# Patient Record
Sex: MALE | Race: WHITE | NOT HISPANIC OR LATINO | Employment: UNEMPLOYED | URBAN - NONMETROPOLITAN AREA
[De-identification: names, ages, dates, MRNs, and addresses within clinical notes are randomized per-mention and may not be internally consistent; named-entity substitution may affect disease eponyms.]

---

## 2019-01-06 ENCOUNTER — HOSPITAL ENCOUNTER (EMERGENCY)
Facility: HOSPITAL | Age: 19
Discharge: HOME | End: 2019-01-06
Attending: EMERGENCY MEDICINE | Admitting: EMERGENCY MEDICINE
Payer: COMMERCIAL

## 2019-01-06 ENCOUNTER — APPOINTMENT (EMERGENCY)
Dept: RADIOLOGY | Facility: HOSPITAL | Age: 19
End: 2019-01-06
Payer: COMMERCIAL

## 2019-01-06 VITALS
OXYGEN SATURATION: 99 % | WEIGHT: 176.81 LBS | TEMPERATURE: 99 F | HEART RATE: 78 BPM | SYSTOLIC BLOOD PRESSURE: 147 MMHG | RESPIRATION RATE: 17 BRPM | DIASTOLIC BLOOD PRESSURE: 97 MMHG

## 2019-01-06 DIAGNOSIS — S83.105A DISLOCATION OF LEFT KNEE, INITIAL ENCOUNTER: Primary | ICD-10-CM

## 2019-01-06 PROCEDURE — 73560 X-RAY EXAM OF KNEE 1 OR 2: CPT

## 2019-01-06 PROCEDURE — 96374 THER/PROPH/DIAG INJ IV PUSH: CPT

## 2019-01-06 PROCEDURE — 99284 EMERGENCY DEPT VISIT MOD MDM: CPT

## 2019-01-06 PROCEDURE — 96360 HYDRATION IV INFUSION INIT: CPT

## 2019-01-06 RX ORDER — MIDAZOLAM HYDROCHLORIDE 1 MG/ML
2 INJECTION INTRAMUSCULAR; INTRAVENOUS ONCE
Status: COMPLETED | OUTPATIENT
Start: 2019-01-06 | End: 2019-01-06

## 2019-01-06 RX ORDER — FENTANYL CITRATE 50 UG/ML
50 INJECTION, SOLUTION INTRAMUSCULAR; INTRAVENOUS ONCE
Status: COMPLETED | OUTPATIENT
Start: 2019-01-06 | End: 2019-01-06

## 2019-01-06 RX ADMIN — SODIUM CHLORIDE 1000 ML: 0.9 INJECTION, SOLUTION INTRAVENOUS at 15:11

## 2019-01-06 RX ADMIN — FENTANYL CITRATE 50 MCG: 50 INJECTION INTRAMUSCULAR; INTRAVENOUS at 15:50

## 2019-01-06 RX ADMIN — MIDAZOLAM HYDROCHLORIDE 2 MG: 1 INJECTION, SOLUTION INTRAMUSCULAR; INTRAVENOUS at 15:51

## 2019-01-06 NOTE — DISCHARGE INSTRUCTIONS
Knee Dislocation   WHAT YOU NEED TO KNOW:   A knee dislocation happens when an injury forces your thigh bone out of alignment with your shin bone  It may occur with other injuries  It can also cause torn ligaments in your knee or nerve damage  It can be caused by a car or motorcycle accident, a sports injury, or a fall  DISCHARGE INSTRUCTIONS:   Medicines: You may need any of the following:  · NSAIDs , such as ibuprofen, help decrease swelling, pain, and fever  This medicine is available with or without a doctor's order  NSAIDs can cause stomach bleeding or kidney problems in certain people  If you take blood thinner medicine, always ask your healthcare provider if NSAIDs are safe for you  Always read the medicine label and follow directions  · Acetaminophen  decreases pain  It is available without a doctor's order  Ask how much to take and how often to take it  Follow directions  Acetaminophen can cause liver damage if not taken correctly  · Prescription pain medicine  helps decrease your pain  Do not wait until the pain is severe before you take this medicine  · Antibiotics  help fight or prevent an infection  They may be given for 1 to 2 days after surgery or if you have an open wound  · Blood thinners  reduce your risk of a blood clot  · Take your medicine as directed  Contact your healthcare provider if you think your medicine is not helping or if you have side effects  Tell him or her if you are allergic to any medicine  Keep a list of the medicines, vitamins, and herbs you take  Include the amounts, and when and why you take them  Bring the list or the pill bottles to follow-up visits  Carry your medicine list with you in case of an emergency  Follow up with your healthcare provider as directed: You may need to have stitches removed after surgery  Write down your questions so you remember to ask them during your visits  Wound care:  Carefully wash the wound with soap and water   Dry the area and put on new, clean bandages as directed  Change your bandages when they get wet or dirty  Self-care: It may take weeks or months for your knee to heal and for you to return to your previous activity level  · Immobilize your knee  for up to 6 weeks or as directed  Your healthcare provider may put on a cast or splint  You may need to wear a leg brace to stabilize your knee  A leg brace can be adjusted to increase your range of motion as your knee heals  · Use crutches  if your healthcare provider tells you not to put weight on your injured knee  Healthcare providers will show how to use crutches  You may need crutches for 4 to 6 weeks  · Physical therapy  teaches you exercises to strengthen your leg, increase balance, and decrease pain  Physical therapy may also help prevent another knee injury  Contact your healthcare provider:   · You have leg pain that does not go away after you take pain medicine  · Your knee is red or swollen, or your wound is leaking pus  · Your stitches come apart  · You have questions or concerns about your condition or care  Seek care immediately or call 911 if:   · Blood soaks through your bandage  · You have severe pain and swelling in your lower leg  · Your lower leg looks pale  · You feel lightheaded, short of breath, and have chest pain  · You cough up blood  · Your leg feels warm, tender, and painful  It may look swollen and red  © 2017 [address] Information is for End User's use only and may not be sold, redistributed or otherwise used for commercial purposes  All illustrations and images included in CareNotes® are the copyrighted property of A D A M , Inc  or Lalit Randolph  The above information is an  only  It is not intended as medical advice for individual conditions or treatments   Talk to your doctor, nurse or pharmacist before following any medical regimen to see if it is safe and effective for you

## 2019-01-06 NOTE — ED PROCEDURE NOTE
Procedure  Orthopedic Injury  Date/Time: 1/6/2019 3:55 PM  Performed by: Deana Lopez by: Sirisha Mcdonnell     Patient Location:  Bedside  Other Assisting Provider: Yes (comment) (Dr Daryl Barba )    Verbal consent obtained?: Yes    Consent given by:  Patient  Patient states understanding of procedure being performed: Yes    Radiology Images displayed and confirmed  If images not available, report reviewed: Yes    Patient identity confirmed:  Verbally with patient  Injury location:  Knee  Location details:  Left knee  Injury type:  Dislocation  Dislocation type: lateral    Neurovascular status: Neurovascularly intact    Distal perfusion: normal    Neurological function: normal    Range of motion: reduced    Local anesthesia used?: No    General anesthesia used?: No    Manipulation performed?: Yes    Reduction method:  Direct traction  Reduction method:  Direct traction  Reduction method:  Direct traction  Reduction method:  Direct traction  Reduction method:  Direct traction  Reduction method:  Direct traction  Reduction successful?: Yes    Confirmation: Reduction confirmed by x-ray    Immobilization:  Knee immobilizer  Neurovascular status: Neurovascularly intact    Distal perfusion: normal    Neurological function: normal    Range of motion: normal    Patient tolerance:  Patient tolerated the procedure well with no immediate complications   Patient was given 2 mg of Versed intravenously as well as 50 mcg of fentanyl  Patient tolerated this procedure well                        Mike Berkowitz PA-C  01/06/19 7607

## 2019-01-06 NOTE — ED PROVIDER NOTES
History  Chief Complaint   Patient presents with    Knee Injury - Major     pt has an obvious deformity to left knee from football injury  Patient presents to the emergency department today for evaluation left knee injury  He presents via [facility]  Patient is alert orient x4 stating while playing football all planted in the ground another player slid into the knee causing a medial to lateral force on the knee  He notes obvious deformity  Presents with knee slightly flexed with apparent obvious patella dislocation  Denies any sensation deficits distally  Full range of motion of toes with normal dorsalis pedis pulse distally            None       History reviewed  No pertinent past medical history  History reviewed  No pertinent surgical history  History reviewed  No pertinent family history  I have reviewed and agree with the history as documented  Social History   Substance Use Topics    Smoking status: Current Every Day Smoker     Packs/day: 0 50     Types: Cigarettes    Smokeless tobacco: Never Used    Alcohol use No        Review of Systems   Constitutional: Negative  HENT: Negative  Eyes: Negative  Respiratory: Negative  Cardiovascular: Negative  Endocrine: Negative  Genitourinary: Negative  Musculoskeletal:        L knee deformity   Skin: Negative  Allergic/Immunologic: Negative  Neurological: Negative  Hematological: Negative  Psychiatric/Behavioral: Negative  All other systems reviewed and are negative  Physical Exam  Physical Exam   Constitutional: He is oriented to person, place, and time  He appears well-developed and well-nourished  No distress  HENT:   Head: Normocephalic  Eyes: Pupils are equal, round, and reactive to light  Neck: Normal range of motion  Cardiovascular: Normal rate  Pulmonary/Chest: Effort normal    Musculoskeletal: He exhibits edema, tenderness and deformity     Apparent laterally dislocated left patella  Patient has normal dorsalis pedis pulse normal sensation and brisk capillary refill less than 2 seconds distally  Neurological: He is alert and oriented to person, place, and time  Skin: He is not diaphoretic  Vitals reviewed  Vital Signs  ED Triage Vitals   Temperature Pulse Respirations Blood Pressure SpO2   01/06/19 1445 01/06/19 1442 01/06/19 1442 01/06/19 1442 01/06/19 1442   99 °F (37 2 °C) 78 21 141/64 100 %      Temp Source Heart Rate Source Patient Position - Orthostatic VS BP Location FiO2 (%)   01/06/19 1445 01/06/19 1442 01/06/19 1442 01/06/19 1442 --   Temporal Monitor Lying Left arm       Pain Score       01/06/19 1442       7           Vitals:    01/06/19 1442 01/06/19 1600   BP: 141/64 147/97   Pulse: 78 78   Patient Position - Orthostatic VS: Lying        Visual Acuity      ED Medications  Medications    EMS REPLENISHMENT MED ( Does not apply Given to EMS 1/6/19 1440)   sodium chloride 0 9 % bolus 1,000 mL (1,000 mL Intravenous New Bag 1/6/19 1511)   midazolam (VERSED) injection 2 mg (2 mg Intravenous Given 1/6/19 1551)   fentanyl citrate (PF) 100 MCG/2ML 50 mcg (50 mcg Intravenous Given 1/6/19 1550)       Diagnostic Studies  Results Reviewed     None                 XR knee 1 or 2 vw left   ED Interpretation by Ramila Kirk PA-C (01/06 1524)   Patellar dislocation without evidence of acute osseous abnormality       by Nay Eagle (01/06 1457)      XR knee 1 or 2 vw left    (Results Pending)              Procedures  Procedures       Phone Contacts  ED Phone Contact    ED Course  ED Course as of Jan 06 1612   Sun Jan 06, 2019   1446 Blood Pressure: 141/64   1446 Temperature: 99 °F (37 2 °C)   1446 Pulse: 78   1446 SpO2: 100 %   1609 Post reduction film reveals successful reduction without evidence of fracture  Patient placed in a knee immobilizer crutches given  Will follow up with Orthopedics                                  DERIK Leonardo Time    Disposition  Final diagnoses:   Dislocation of left knee, initial encounter     Time reflects when diagnosis was documented in both MDM as applicable and the Disposition within this note     Time User Action Codes Description Comment    1/6/2019  4:10 PM Merlinda Slocumb Add [O50 105A] Dislocation of left knee, initial encounter       ED Disposition     ED Disposition Condition Comment    Discharge  Sole Salgado discharge to home/self care  Condition at discharge: Good        Follow-up Information     Follow up With Specialties Details Why Contact Info    Esme Brooks MD Orthopedic Surgery Schedule an appointment as soon as possible for a visit If symptoms worsen [address], [place] [address] [phone]            Patient's Medications    No medications on file     No discharge procedures on file      ED Provider  Electronically Signed by           Janette Esquivel PA-C  01/06/19 3824

## 2019-01-14 ENCOUNTER — HOSPITAL ENCOUNTER (OUTPATIENT)
Facility: HOSPITAL | Age: 19
Setting detail: OBSERVATION
Discharge: HOME HEALTH | End: 2019-01-17
Attending: EMERGENCY MEDICINE | Admitting: INTERNAL MEDICINE
Payer: COMMERCIAL

## 2019-01-14 ENCOUNTER — APPOINTMENT (EMERGENCY)
Dept: CT IMAGING | Facility: HOSPITAL | Age: 19
End: 2019-01-14
Payer: COMMERCIAL

## 2019-01-14 ENCOUNTER — APPOINTMENT (EMERGENCY)
Dept: ULTRASOUND IMAGING | Facility: HOSPITAL | Age: 19
End: 2019-01-14
Payer: COMMERCIAL

## 2019-01-14 DIAGNOSIS — M25.562 ACUTE PAIN OF LEFT KNEE: ICD-10-CM

## 2019-01-14 DIAGNOSIS — M79.89 PAIN AND SWELLING OF LOWER EXTREMITY: ICD-10-CM

## 2019-01-14 DIAGNOSIS — M25.469 KNEE EFFUSION: Primary | ICD-10-CM

## 2019-01-14 DIAGNOSIS — S83.005D DISLOCATION OF LEFT PATELLA, SUBSEQUENT ENCOUNTER: ICD-10-CM

## 2019-01-14 DIAGNOSIS — M25.462 EFFUSION OF LEFT KNEE: ICD-10-CM

## 2019-01-14 DIAGNOSIS — M79.606 PAIN AND SWELLING OF LOWER EXTREMITY: ICD-10-CM

## 2019-01-14 LAB
ANION GAP SERPL CALCULATED.3IONS-SCNC: 10 MMOL/L (ref 4–13)
APTT PPP: 29 SECONDS (ref 26–38)
BASOPHILS # BLD AUTO: 0.07 THOUSANDS/ΜL (ref 0–0.1)
BASOPHILS NFR BLD AUTO: 1 % (ref 0–1)
BUN SERPL-MCNC: 14 MG/DL (ref 5–25)
CALCIUM SERPL-MCNC: 9.6 MG/DL (ref 8.3–10.1)
CHLORIDE SERPL-SCNC: 100 MMOL/L (ref 100–108)
CO2 SERPL-SCNC: 29 MMOL/L (ref 21–32)
CREAT SERPL-MCNC: 0.93 MG/DL (ref 0.6–1.3)
EOSINOPHIL # BLD AUTO: 0.19 THOUSAND/ΜL (ref 0–0.61)
EOSINOPHIL NFR BLD AUTO: 2 % (ref 0–6)
ERYTHROCYTE [DISTWIDTH] IN BLOOD BY AUTOMATED COUNT: 12.6 % (ref 11.6–15.1)
GFR SERPL CREATININE-BSD FRML MDRD: 119 ML/MIN/1.73SQ M
GLUCOSE SERPL-MCNC: 93 MG/DL (ref 65–140)
HCT VFR BLD AUTO: 42 % (ref 36.5–49.3)
HGB BLD-MCNC: 14.2 G/DL (ref 12–17)
IMM GRANULOCYTES # BLD AUTO: 0.04 THOUSAND/UL (ref 0–0.2)
IMM GRANULOCYTES NFR BLD AUTO: 0 % (ref 0–2)
INR PPP: 0.97 (ref 0.86–1.17)
LYMPHOCYTES # BLD AUTO: 1.32 THOUSANDS/ΜL (ref 0.6–4.47)
LYMPHOCYTES NFR BLD AUTO: 10 % (ref 14–44)
MCH RBC QN AUTO: 31.1 PG (ref 26.8–34.3)
MCHC RBC AUTO-ENTMCNC: 33.8 G/DL (ref 31.4–37.4)
MCV RBC AUTO: 92 FL (ref 82–98)
MONOCYTES # BLD AUTO: 1.11 THOUSAND/ΜL (ref 0.17–1.22)
MONOCYTES NFR BLD AUTO: 9 % (ref 4–12)
NEUTROPHILS # BLD AUTO: 10.33 THOUSANDS/ΜL (ref 1.85–7.62)
NEUTS SEG NFR BLD AUTO: 78 % (ref 43–75)
NRBC BLD AUTO-RTO: 0 /100 WBCS
PLATELET # BLD AUTO: 345 THOUSANDS/UL (ref 149–390)
PMV BLD AUTO: 8.9 FL (ref 8.9–12.7)
POTASSIUM SERPL-SCNC: 3.8 MMOL/L (ref 3.5–5.3)
PROTHROMBIN TIME: 12.4 SECONDS (ref 11.8–14.2)
RBC # BLD AUTO: 4.56 MILLION/UL (ref 3.88–5.62)
SODIUM SERPL-SCNC: 139 MMOL/L (ref 136–145)
WBC # BLD AUTO: 13.06 THOUSAND/UL (ref 4.31–10.16)

## 2019-01-14 PROCEDURE — 80048 BASIC METABOLIC PNL TOTAL CA: CPT | Performed by: PHYSICIAN ASSISTANT

## 2019-01-14 PROCEDURE — 99285 EMERGENCY DEPT VISIT HI MDM: CPT

## 2019-01-14 PROCEDURE — 96361 HYDRATE IV INFUSION ADD-ON: CPT

## 2019-01-14 PROCEDURE — 85025 COMPLETE CBC W/AUTO DIFF WBC: CPT | Performed by: PHYSICIAN ASSISTANT

## 2019-01-14 PROCEDURE — 93971 EXTREMITY STUDY: CPT

## 2019-01-14 PROCEDURE — 96374 THER/PROPH/DIAG INJ IV PUSH: CPT

## 2019-01-14 PROCEDURE — 96376 TX/PRO/DX INJ SAME DRUG ADON: CPT

## 2019-01-14 PROCEDURE — 85610 PROTHROMBIN TIME: CPT | Performed by: PHYSICIAN ASSISTANT

## 2019-01-14 PROCEDURE — 85730 THROMBOPLASTIN TIME PARTIAL: CPT | Performed by: PHYSICIAN ASSISTANT

## 2019-01-14 PROCEDURE — 73706 CT ANGIO LWR EXTR W/O&W/DYE: CPT

## 2019-01-14 RX ORDER — MORPHINE SULFATE 4 MG/ML
4 INJECTION, SOLUTION INTRAMUSCULAR; INTRAVENOUS ONCE
Status: COMPLETED | OUTPATIENT
Start: 2019-01-14 | End: 2019-01-14

## 2019-01-14 RX ADMIN — MORPHINE SULFATE 4 MG: 4 INJECTION INTRAVENOUS at 21:38

## 2019-01-14 RX ADMIN — SODIUM CHLORIDE 1000 ML: 0.9 INJECTION, SOLUTION INTRAVENOUS at 19:06

## 2019-01-14 RX ADMIN — IOHEXOL 120 ML: 350 INJECTION, SOLUTION INTRAVENOUS at 20:36

## 2019-01-14 RX ADMIN — MORPHINE SULFATE 4 MG: 4 INJECTION INTRAVENOUS at 19:06

## 2019-01-14 NOTE — ED NOTES
The knee was dislocated on 1-6-19, since then he has been having swelling for three days and today the pain was 9/10 that he had to come to the ED  The leg has +3 edema, and cant feel soft touch on his toes       Artur Yousif RN  01/14/19 0759

## 2019-01-15 ENCOUNTER — APPOINTMENT (OUTPATIENT)
Dept: RADIOLOGY | Facility: HOSPITAL | Age: 19
End: 2019-01-15
Payer: COMMERCIAL

## 2019-01-15 ENCOUNTER — APPOINTMENT (OUTPATIENT)
Dept: MRI IMAGING | Facility: HOSPITAL | Age: 19
End: 2019-01-15
Payer: COMMERCIAL

## 2019-01-15 PROBLEM — M25.462 KNEE EFFUSION, LEFT: Status: ACTIVE | Noted: 2019-01-15

## 2019-01-15 PROBLEM — R52 ACUTE PAIN: Status: ACTIVE | Noted: 2019-01-15

## 2019-01-15 PROBLEM — M25.562 ACUTE PAIN OF LEFT KNEE: Status: ACTIVE | Noted: 2019-01-15

## 2019-01-15 LAB
ANION GAP SERPL CALCULATED.3IONS-SCNC: 8 MMOL/L (ref 4–13)
BUN SERPL-MCNC: 13 MG/DL (ref 5–25)
CALCIUM SERPL-MCNC: 9.1 MG/DL (ref 8.3–10.1)
CHLORIDE SERPL-SCNC: 101 MMOL/L (ref 100–108)
CO2 SERPL-SCNC: 29 MMOL/L (ref 21–32)
CREAT SERPL-MCNC: 0.98 MG/DL (ref 0.6–1.3)
CRYSTALS SNV QL MICRO: NORMAL
ERYTHROCYTE [DISTWIDTH] IN BLOOD BY AUTOMATED COUNT: 12.6 % (ref 11.6–15.1)
GFR SERPL CREATININE-BSD FRML MDRD: 111 ML/MIN/1.73SQ M
GLUCOSE P FAST SERPL-MCNC: 95 MG/DL (ref 65–99)
GLUCOSE SERPL-MCNC: 95 MG/DL (ref 65–140)
HCT VFR BLD AUTO: 39.8 % (ref 36.5–49.3)
HGB BLD-MCNC: 13.1 G/DL (ref 12–17)
LYMPHOCYTES NFR BLD AUTO: 3 %
MAGNESIUM SERPL-MCNC: 2.3 MG/DL (ref 1.6–2.6)
MCH RBC QN AUTO: 30.8 PG (ref 26.8–34.3)
MCHC RBC AUTO-ENTMCNC: 32.9 G/DL (ref 31.4–37.4)
MCV RBC AUTO: 93 FL (ref 82–98)
MONOCYTES NFR BLD AUTO: 3 %
NEUTS SEG NFR BLD AUTO: 94 %
PHOSPHATE SERPL-MCNC: 5.2 MG/DL (ref 2.7–4.5)
PLATELET # BLD AUTO: 296 THOUSANDS/UL (ref 149–390)
PMV BLD AUTO: 8.8 FL (ref 8.9–12.7)
POTASSIUM SERPL-SCNC: 3.7 MMOL/L (ref 3.5–5.3)
RBC # BLD AUTO: 4.26 MILLION/UL (ref 3.88–5.62)
SODIUM SERPL-SCNC: 138 MMOL/L (ref 136–145)
TOTAL CELLS COUNTED SPEC: 100
WBC # BLD AUTO: 9.89 THOUSAND/UL (ref 4.31–10.16)
WBC # FLD MANUAL: 1538 /UL

## 2019-01-15 PROCEDURE — 83735 ASSAY OF MAGNESIUM: CPT | Performed by: NURSE PRACTITIONER

## 2019-01-15 PROCEDURE — 73564 X-RAY EXAM KNEE 4 OR MORE: CPT

## 2019-01-15 PROCEDURE — 73721 MRI JNT OF LWR EXTRE W/O DYE: CPT

## 2019-01-15 PROCEDURE — 89051 BODY FLUID CELL COUNT: CPT | Performed by: ORTHOPAEDIC SURGERY

## 2019-01-15 PROCEDURE — 87070 CULTURE OTHR SPECIMN AEROBIC: CPT | Performed by: ORTHOPAEDIC SURGERY

## 2019-01-15 PROCEDURE — 99220 PR INITIAL OBSERVATION CARE/DAY 70 MINUTES: CPT | Performed by: NURSE PRACTITIONER

## 2019-01-15 PROCEDURE — 89060 EXAM SYNOVIAL FLUID CRYSTALS: CPT | Performed by: ORTHOPAEDIC SURGERY

## 2019-01-15 PROCEDURE — 80048 BASIC METABOLIC PNL TOTAL CA: CPT | Performed by: NURSE PRACTITIONER

## 2019-01-15 PROCEDURE — 99243 OFF/OP CNSLTJ NEW/EST LOW 30: CPT | Performed by: ORTHOPAEDIC SURGERY

## 2019-01-15 PROCEDURE — 93971 EXTREMITY STUDY: CPT | Performed by: SURGERY

## 2019-01-15 PROCEDURE — 84100 ASSAY OF PHOSPHORUS: CPT | Performed by: NURSE PRACTITIONER

## 2019-01-15 PROCEDURE — 36415 COLL VENOUS BLD VENIPUNCTURE: CPT | Performed by: NURSE PRACTITIONER

## 2019-01-15 PROCEDURE — 85027 COMPLETE CBC AUTOMATED: CPT | Performed by: NURSE PRACTITIONER

## 2019-01-15 PROCEDURE — 87205 SMEAR GRAM STAIN: CPT | Performed by: ORTHOPAEDIC SURGERY

## 2019-01-15 PROCEDURE — 20610 DRAIN/INJ JOINT/BURSA W/O US: CPT | Performed by: ORTHOPAEDIC SURGERY

## 2019-01-15 RX ORDER — OXYCODONE HYDROCHLORIDE 5 MG/1
5 TABLET ORAL EVERY 4 HOURS PRN
Status: DISCONTINUED | OUTPATIENT
Start: 2019-01-15 | End: 2019-01-15

## 2019-01-15 RX ORDER — NICOTINE 21 MG/24HR
1 PATCH, TRANSDERMAL 24 HOURS TRANSDERMAL DAILY
Status: DISCONTINUED | OUTPATIENT
Start: 2019-01-15 | End: 2019-01-17 | Stop reason: HOSPADM

## 2019-01-15 RX ORDER — OXYCODONE HYDROCHLORIDE 10 MG/1
10 TABLET ORAL EVERY 4 HOURS PRN
Status: DISCONTINUED | OUTPATIENT
Start: 2019-01-15 | End: 2019-01-17 | Stop reason: HOSPADM

## 2019-01-15 RX ORDER — OXYCODONE HYDROCHLORIDE 5 MG/1
5 TABLET ORAL EVERY 4 HOURS PRN
Status: DISCONTINUED | OUTPATIENT
Start: 2019-01-15 | End: 2019-01-17 | Stop reason: HOSPADM

## 2019-01-15 RX ORDER — MORPHINE SULFATE 4 MG/ML
4 INJECTION, SOLUTION INTRAMUSCULAR; INTRAVENOUS ONCE
Status: COMPLETED | OUTPATIENT
Start: 2019-01-15 | End: 2019-01-15

## 2019-01-15 RX ORDER — ACETAMINOPHEN 325 MG/1
650 TABLET ORAL EVERY 4 HOURS PRN
Status: DISCONTINUED | OUTPATIENT
Start: 2019-01-15 | End: 2019-01-17 | Stop reason: HOSPADM

## 2019-01-15 RX ORDER — IBUPROFEN 200 MG
400 TABLET ORAL EVERY 6 HOURS PRN
COMMUNITY
End: 2019-01-17 | Stop reason: HOSPADM

## 2019-01-15 RX ORDER — HYDROMORPHONE HCL/PF 1 MG/ML
0.5 SYRINGE (ML) INJECTION ONCE
Status: COMPLETED | OUTPATIENT
Start: 2019-01-15 | End: 2019-01-15

## 2019-01-15 RX ADMIN — OXYCODONE HYDROCHLORIDE 5 MG: 5 TABLET ORAL at 12:03

## 2019-01-15 RX ADMIN — NICOTINE 1 PATCH: 21 PATCH, EXTENDED RELEASE TRANSDERMAL at 12:05

## 2019-01-15 RX ADMIN — HYDROMORPHONE HYDROCHLORIDE 0.5 MG: 1 INJECTION, SOLUTION INTRAMUSCULAR; INTRAVENOUS; SUBCUTANEOUS at 02:17

## 2019-01-15 RX ADMIN — OXYCODONE HYDROCHLORIDE 10 MG: 10 TABLET ORAL at 21:33

## 2019-01-15 RX ADMIN — MORPHINE SULFATE 4 MG: 4 INJECTION INTRAVENOUS at 00:52

## 2019-01-15 RX ADMIN — OXYCODONE HYDROCHLORIDE 10 MG: 10 TABLET ORAL at 16:17

## 2019-01-15 NOTE — ED PROVIDER NOTES
History  Chief Complaint   Patient presents with    Leg Pain     Left leg pain from the knee down, edema noted, started three days ago       History provided by:  Patient  Leg Pain   Location:  Leg and knee  Time since incident:  6 days  Injury: yes    Mechanism of injury comment:  Kelsey Dryer onto left knee on 1/8/19  sustained lateral patella dislocation  seen in ED  is s/p closed reduction patella dislocation at bedside  Wearing knee immobilizer on and off  swelling and pain increased 3 days ago  Leg location:  L leg  Knee location:  L knee  Pain details:     Quality:  Aching    Radiates to:  L leg    Severity:  Severe    Onset quality:  Gradual    Duration:  3 days    Timing:  Constant    Progression:  Worsening  Chronicity:  New  Dislocation: yes (s/p reduction 1/8/19 left patella)    Foreign body present:  No foreign bodies  Tetanus status:  Up to date  Prior injury to area:  No  Relieved by:  None tried  Worsened by:  Bearing weight, flexion and rotation  Ineffective treatments:  None tried  Associated symptoms: decreased ROM and swelling    Associated symptoms: no back pain, no fatigue, no fever and no neck pain    Swelling:     Location: left leg from proximal thigh through toes  Onset quality:  Gradual    Duration:  3 days    Timing:  Constant    Progression:  Worsening    Chronicity:  New      Prior to Admission Medications   Prescriptions Last Dose Informant Patient Reported? Taking?   ibuprofen (MOTRIN) 200 mg tablet   Yes Yes   Sig: Take 400 mg by mouth every 6 (six) hours as needed for mild pain      Facility-Administered Medications: None       Past Medical History:   Diagnosis Date    Fracture, clavicle     at birth    Knee dislocation        History reviewed  No pertinent surgical history  Family History   Problem Relation Age of Onset    No Known Problems Mother     Heart disease Father      I have reviewed and agree with the history as documented      Social History   Substance Use Topics    Smoking status: Current Every Day Smoker     Packs/day: 1 00     Years: 13 00    Smokeless tobacco: Never Used      Comment: Refused    Alcohol use No        Review of Systems   Constitutional: Negative for activity change, appetite change, chills, fatigue and fever  HENT: Negative for congestion, ear pain, facial swelling, hearing loss, rhinorrhea, sinus pressure and sore throat  Eyes: Negative for photophobia, pain, redness, itching and visual disturbance  Respiratory: Negative for cough, shortness of breath and wheezing  Cardiovascular: Positive for leg swelling  Negative for chest pain and palpitations  Gastrointestinal: Negative for abdominal pain, constipation, diarrhea, nausea and vomiting  Genitourinary: Negative for dysuria, flank pain, frequency, hematuria and urgency  Musculoskeletal: Positive for arthralgias, gait problem and joint swelling  Negative for back pain, myalgias and neck pain  Skin: Negative for rash and wound  Allergic/Immunologic: Negative for immunocompromised state  Neurological: Negative for dizziness, syncope, weakness, light-headedness, numbness and headaches  Physical Exam  Physical Exam   Constitutional: He is oriented to person, place, and time  He appears well-developed and well-nourished  No distress  HENT:   Head: Normocephalic and atraumatic  Nose: Nose normal    Eyes: Pupils are equal, round, and reactive to light  Conjunctivae are normal    Neck: Neck supple  Cardiovascular: Regular rhythm, normal heart sounds, intact distal pulses and normal pulses  Tachycardia present  No murmur heard  Pulses:       Dorsalis pedis pulses are 2+ on the right side, and 2+ on the left side  Posterior tibial pulses are 2+ on the right side, and 2+ on the left side  Tachycardia 120s, regular   Pulmonary/Chest: Effort normal and breath sounds normal  No respiratory distress  He exhibits no tenderness  Abdominal: Soft   Bowel sounds are normal  Musculoskeletal: He exhibits edema (3+ edema LLE from mid thigh through foot and toes  skin color normal, not tense, no ecchymosis  Beatriz Juany sensation intact proximal and distal leg  motor limited by pain) and tenderness  Left hip: He exhibits decreased range of motion and decreased strength  He exhibits no tenderness, no bony tenderness, no swelling, no crepitus, no deformity and no laceration  Left knee: He exhibits decreased range of motion (held in extension  ROM limited severely by pain), swelling and effusion  He exhibits no ecchymosis  Tenderness found  Medial joint line and lateral joint line tenderness noted  No patellar tendon tenderness noted  Left ankle: He exhibits decreased range of motion and swelling  He exhibits no ecchymosis and normal pulse  No tenderness  No lateral malleolus and no medial malleolus tenderness found  Legs:       Left foot: There is tenderness and swelling  There is normal range of motion, no bony tenderness, normal capillary refill, no crepitus, no deformity and no laceration  Feet:   Left Foot:   Skin Integrity: Negative for ulcer, blister, skin breakdown, erythema, warmth, callus or dry skin  Neurological: He is alert and oriented to person, place, and time  Skin: Skin is warm and dry  He is not diaphoretic  No erythema  No pallor  Poor hygiene, feet dirty with debris/dirt   Psychiatric: He has a normal mood and affect  Nursing note and vitals reviewed        Vital Signs  ED Triage Vitals [01/14/19 1732]   Temperature Pulse Respirations Blood Pressure SpO2   98 2 °F (36 8 °C) (!) 134 20 149/76 98 %      Temp Source Heart Rate Source Patient Position - Orthostatic VS BP Location FiO2 (%)   Temporal Monitor Lying Right arm --      Pain Score       9           Vitals:    01/15/19 0630 01/15/19 0743 01/15/19 0900 01/15/19 1555   BP: 108/71 122/71 133/76 132/72   Pulse: 97 81 (!) 106 (!) 118   Patient Position - Orthostatic VS: Lying Lying Lying Lying Visual Acuity      ED Medications  Medications   nicotine (NICODERM CQ) 21 mg/24 hr TD 24 hr patch 1 patch (1 patch Transdermal Medication Applied 1/15/19 1205)   acetaminophen (TYLENOL) tablet 650 mg (not administered)   influenza vaccine, quadrivalent (FLULAVAL) IM injection 0 5 mL (not administered)   oxyCODONE (ROXICODONE) immediate release tablet 10 mg (10 mg Oral Given 1/15/19 1617)   oxyCODONE (ROXICODONE) IR tablet 5 mg (not administered)   enoxaparin (LOVENOX) subcutaneous injection 40 mg (not administered)   morphine (PF) 4 mg/mL injection 4 mg (4 mg Intravenous Given 1/14/19 1906)   sodium chloride 0 9 % bolus 1,000 mL (0 mL Intravenous Stopped 1/14/19 2019)   iohexol (OMNIPAQUE) 350 MG/ML injection (MULTI-DOSE) 120 mL (120 mL Intravenous Given 1/14/19 2036)   morphine (PF) 4 mg/mL injection 4 mg (4 mg Intravenous Given 1/14/19 2138)   morphine (PF) 4 mg/mL injection 4 mg (4 mg Intravenous Given 1/15/19 0052)   HYDROmorphone (DILAUDID) injection 0 5 mg (0 5 mg Intravenous Given 1/15/19 0217)       Diagnostic Studies  Results Reviewed     Procedure Component Value Units Date/Time    Basic metabolic panel [[id number]] Collected:  01/15/19 0551    Lab Status:  Final result Specimen:  Blood from Arm, Right Updated:  01/15/19 1262     Sodium 138 mmol/L      Potassium 3 7 mmol/L      Chloride 101 mmol/L      CO2 29 mmol/L      ANION GAP 8 mmol/L      BUN 13 mg/dL      Creatinine 0 98 mg/dL      Glucose 95 mg/dL      Glucose, Fasting 95 mg/dL      Calcium 9 1 mg/dL      eGFR 111 ml/min/1 73sq m     Narrative:         National Kidney Disease Education Program recommendations are as follows:  GFR calculation is accurate only with a steady state creatinine  Chronic Kidney disease less than 60 ml/min/1 73 sq  meters  Kidney failure less than 15 ml/min/1 73 sq  meters      Magnesium [[id number]]  (Normal) Collected:  01/15/19 0551    Lab Status:  Final result Specimen:  Blood from Arm, Right Updated:  01/15/19 4388     Magnesium 2 3 mg/dL     Phosphorus [[id number]]  (Abnormal) Collected:  01/15/19 0551    Lab Status:  Final result Specimen:  Blood from Arm, Right Updated:  01/15/19 0618     Phosphorus 5 2 (H) mg/dL     CBC (With Platelets) [[id number]]  (Abnormal) Collected:  01/15/19 0551    Lab Status:  Final result Specimen:  Blood from Arm, Right Updated:  01/15/19 0557     WBC 9 89 Thousand/uL      RBC 4 26 Million/uL      Hemoglobin 13 1 g/dL      Hematocrit 39 8 %      MCV 93 fL      MCH 30 8 pg      MCHC 32 9 g/dL      RDW 12 6 %      Platelets 381 Thousands/uL      MPV 8 8 (L) fL     Basic metabolic panel [[id number]] Collected:  01/14/19 1859    Lab Status:  Final result Specimen:  Blood from Arm, Right Updated:  01/14/19 1916     Sodium 139 mmol/L      Potassium 3 8 mmol/L      Chloride 100 mmol/L      CO2 29 mmol/L      ANION GAP 10 mmol/L      BUN 14 mg/dL      Creatinine 0 93 mg/dL      Glucose 93 mg/dL      Calcium 9 6 mg/dL      eGFR 119 ml/min/1 73sq m     Narrative:         National Kidney Disease Education Program recommendations are as follows:  GFR calculation is accurate only with a steady state creatinine  Chronic Kidney disease less than 60 ml/min/1 73 sq  meters  Kidney failure less than 15 ml/min/1 73 sq  meters      Ankur Yen [[id number]]  (Normal) Collected:  01/14/19 1859    Lab Status:  Final result Specimen:  Blood from Arm, Right Updated:  01/14/19 1912     Protime 12 4 seconds      INR 0 97    APTT [[id number]]  (Normal) Collected:  01/14/19 1859    Lab Status:  Final result Specimen:  Blood from Arm, Right Updated:  01/14/19 1912     PTT 29 seconds     CBC and differential [[id number]]  (Abnormal) Collected:  01/14/19 1859    Lab Status:  Final result Specimen:  Blood from Arm, Right Updated:  01/14/19 1905     WBC 13 06 (H) Thousand/uL      RBC 4 56 Million/uL      Hemoglobin 14 2 g/dL      Hematocrit 42 0 %      MCV 92 fL      MCH 31 1 pg      MCHC 33 8 g/dL      RDW 12 6 %      MPV 8 9 fL Platelets 189 Thousands/uL      nRBC 0 /100 WBCs      Neutrophils Relative 78 (H) %      Immat GRANS % 0 %      Lymphocytes Relative 10 (L) %      Monocytes Relative 9 %      Eosinophils Relative 2 %      Basophils Relative 1 %      Neutrophils Absolute 10 33 (H) Thousands/µL      Immature Grans Absolute 0 04 Thousand/uL      Lymphocytes Absolute 1 32 Thousands/µL      Monocytes Absolute 1 11 Thousand/µL      Eosinophils Absolute 0 19 Thousand/µL      Basophils Absolute 0 07 Thousands/µL                  VAS lower limb venous duplex study, unilateral/limited   Final Result by Sebastián Talbot MD (01/15 1261)      CTA lower extremity left w wo contrast   Final Result by Manasa Talley MD (01/15 1603)      No evidence of vascular injury in the left leg      Complex fluid collection identified on venous Doppler earlier today likely corresponds to moderate-sized joint effusion of the left knee as no other fluid collection is seen  There is no hematoma  Workstation performed: [id number]         RADIOLOGY RESULTS   Final Result by  (01/15 1253)      XR knee 4+ vw left injury   Final Result by Valeri Hayes MD (01/15 6783)      No acute osseous abnormality  Small joint effusion  Workstation performed: [id number]         MRI knee left  wo contrast    (Results Pending)              Procedures  Procedures       Phone Contacts  ED Phone Contact    ED Course  ED Course as of Kade 15 1811   Mon Jan 14, 2019   1912 WBC: (!) 13 06   1912 HCT: 42 0   1912 Platelet Count: [address] Guzmán Sodium: 139   1923 Potassium: 3 8   1923 Chloride: 100   1923 CO2: 29   1923 Anion Gap: 10   1923 BUN: 14   1923 Creatinine: 0 93   1923 Glucose, Random: 93   1923 Calcium: 9 6   1923 eGFR: 119   1923 PTT: 29   1923 Protime: 12 4   1923 INR: 0 97   2126 Case signed out to Dr Quincy Tipton  Pending CTA result plan to admit to medicine, ortho consult   If CTA suggests vascular injury will require transfer for vascular surgery consult as well           MDM  Number of Diagnoses or Management Options  Knee effusion: new and requires workup  Pain and swelling of lower extremity: new and requires workup     Amount and/or Complexity of Data Reviewed  Tests in the radiology section of CPT®: ordered and reviewed  Review and summarize past medical records: yes  Discuss the patient with other providers: yes  Independent visualization of images, tracings, or specimens: yes    Risk of Complications, Morbidity, and/or Mortality  Presenting problems: high  Diagnostic procedures: high  Management options: high    Patient Progress  Patient progress: stable    CritCare Time    Disposition  Final diagnoses:   Knee effusion   Pain and swelling of lower extremity     Time reflects when diagnosis was documented in both MDM as applicable and the Disposition within this note     Time User Action Codes Description Comment    1/15/2019 12:13 AM Khai Garcia Add [M25 469] Knee effusion     1/15/2019 12:13 AM Khai Garcia Add [M79 606,  M79 89] Pain and swelling of lower extremity       ED Disposition     ED Disposition Condition Comment    Admit  Case was discussed with ADELITA and the patient's admission status was agreed to be Admission Status: observation status to the service of Dr Loan Tom   Follow-up Information    None         Current Discharge Medication List      CONTINUE these medications which have NOT CHANGED    Details   ibuprofen (MOTRIN) 200 mg tablet Take 400 mg by mouth every 6 (six) hours as needed for mild pain           No discharge procedures on file      ED Provider  Electronically Signed by           Amber Crooks PA-C  01/15/19 9948

## 2019-01-15 NOTE — ASSESSMENT & PLAN NOTE
Carlton Boswell onto left knee on 1/8/19  sustained lateral patella dislocation  seen in ED  is s/p closed reduction patella dislocation at bedside  Wearing knee immobilizer  swelling and pain increased 3 days ago  Ortho consultation appreciated  CT left lower ext: No evidence of vascular injury in the left leg  Complex fluid collection identified on venous Doppler earlier today likely corresponds to moderate-sized joint effusion of the left knee as no other fluid collection is seen  There is no hematoma  PRN pain control    MRI left lower ext pending

## 2019-01-15 NOTE — H&P
H&P- Marisela Zurita 2000, 22 y o  male MRN: [id number]    Unit/Bed#: RM07 Encounter: [id number]    Primary Care Provider: Heather Gannon MD   Date and time admitted to hospital: 1/14/2019  5:29 PM        Knee effusion, left   Assessment & Plan    Ortho consulted  Non weight-bearing left lower extremity until Ortho evaluate  Provide analgesia as needed  Provide supportive care     Acute pain   Assessment & Plan    Javid Marsha onto left knee on 1/8/19  sustained lateral patella dislocation  seen in ED  is s/p closed reduction patella dislocation at bedside  Wearing knee immobilizer  swelling and pain increased 3 days ago  Ortho consulted  Provide analgesia as needed  Non weight-bearing left lower extremity until ortho evaluation          VTE Prophylaxis: Low risk ambulate early  / reason for no mechanical VTE prophylaxis Low risk ambulate early   Code Status:  Full  POLST: POLST is not applicable to this patient    Anticipated Length of Stay:  Patient will be admitted on an Observation basis with an anticipated length of stay of  less than 2 midnights  Justification for Hospital Stay:  Left knee effusion    Total Time for Visit, including Counseling / Coordination of Care: 45 minutes  Greater than 50% of this total time spent on direct patient counseling and coordination of care  Chief Complaint:   My left leg is swollen in hurts    History of Present Illness:    Marisela Zurita is a 22 y o  male who presents with for evaluation of left lower extremity swelling and pain  Patient states that on January 8, 2019 DT was playing football fell in a ditch and hurt his left knee  Patient did receive treatment at [facility] ER, images and labs were collected  I patient has a brace and instructed to follow up Ortho  Patient reports he did not follow up with Ortho because he he walks everywhere he goes could is Ortho doctor  The patient is in extreme 10/10 pain    Patient will be admitted for observation and ortho consult for the consulted  Review of Systems:    Review of Systems   Cardiovascular: Positive for leg swelling ( left lower extremity +3-+4)  Musculoskeletal: Positive for joint swelling ( left knee)  Neurological: Positive for weakness ( left lower extremity)  All other systems reviewed and are negative  Past Medical and Surgical History:     Past Medical History:   Diagnosis Date    Knee dislocation        History reviewed  No pertinent surgical history  Meds/Allergies:    Prior to Admission medications    Not on File     I have reviewed home medications with patient personally  Allergies: Allergies   Allergen Reactions    Aloe Burn Relief [Lidocaine]        Social History:     Marital Status: Single   Occupation: unknown  Patient Pre-hospital Living Situation: independent  Patient Pre-hospital Level of Mobility: limited  Patient Pre-hospital Diet Restrictions: none  Substance Use History:   History   Alcohol Use No     History   Smoking Status    Current Every Day Smoker   Smokeless Tobacco    Never Used     History   Drug Use No       Family History:    History reviewed  No pertinent family history  Physical Exam:     Vitals:   Blood Pressure: 153/75 (01/14/19 2141)  Pulse: (!) 118 (01/14/19 2141)  Temperature: 98 2 °F (36 8 °C) (01/14/19 1732)  Temp Source: Temporal (01/14/19 1732)  Respirations: 18 (01/14/19 2141)  Height: 6' 1" (185 4 cm) (01/14/19 1732)  Weight - Scale: 80 8 kg (178 lb 2 1 oz) (01/14/19 1732)  SpO2: 99 % (01/14/19 2141)    Physical Exam   Constitutional: He is oriented to person, place, and time  He appears well-developed and well-nourished  No distress  HENT:   Head: Normocephalic and atraumatic  Eyes: Pupils are equal, round, and reactive to light  Right eye exhibits no discharge  Left eye exhibits no discharge  No scleral icterus  Neck: Normal range of motion  Neck supple  No JVD present  No tracheal deviation present  No thyromegaly present  Cardiovascular: Normal heart sounds and intact distal pulses  Tachycardia present  Exam reveals no gallop, no distant heart sounds and no friction rub  No murmur heard  Pulmonary/Chest: Effort normal  No stridor  No respiratory distress  He has no wheezes  He has no rales  Abdominal: Soft  Bowel sounds are normal  He exhibits no distension  There is no tenderness  There is no rebound  Musculoskeletal: He exhibits edema (Left lower extremity +3 to +4) and tenderness (To touch)  He exhibits no deformity  Unable to assess left knee and ankle due to severity of edema   Neurological: He is alert and oriented to person, place, and time  He displays normal reflexes  No cranial nerve deficit  He exhibits normal muscle tone  Coordination normal    Skin: Skin is warm and dry  No rash noted  He is not diaphoretic  No erythema  No pallor  Psychiatric: He has a normal mood and affect  His behavior is normal  Judgment and thought content normal          Additional Data:     Lab Results: I have personally reviewed pertinent reports  Results from last 7 days  Lab Units 01/14/19  1859   WBC Thousand/uL 13 06*   HEMOGLOBIN g/dL 14 2   HEMATOCRIT % 42 0   PLATELETS Thousands/uL 345   NEUTROS PCT % 78*   LYMPHS PCT % 10*   MONOS PCT % 9   EOS PCT % 2       Results from last 7 days  Lab Units 01/14/19  1859   POTASSIUM mmol/L 3 8   CHLORIDE mmol/L 100   CO2 mmol/L 29   BUN mg/dL 14   CREATININE mg/dL 0 93   CALCIUM mg/dL 9 6       Results from last 7 days  Lab Units 01/14/19  1859   INR  0 97       Imaging: I have personally reviewed pertinent reports  Vas Lower Limb Venous Duplex Study, Unilateral/limited    Result Date: 1/14/2019  Narrative:  [facility] REPORT [facility]ICAL: Indications: Left Limb Pain [M79 [id number]]  Patient dislocated patella several days ago, patients leg extremely swollen    FINDINGS:  Left     Impression       CFV      Normal (Patent)     CONCLUSION:   LEFT LOWER LIMB: No evidence of acute or chronic deep vein thrombosis No evidence of superficial thrombophlebitis noted  Doppler evaluation shows a normal response to augmentation maneuvers  Popliteal, posterior tibial and anterior tibial arterial Doppler waveforms are triphasic   Technical findings were given to [employer]  Note: There is a complex fluid collection noted in the anterior medial knee measuring 5 0 x4 8 x1 4 cm  Allscripts / Epic Records Reviewed: Yes     ** Please Note: This note has been constructed using a voice recognition system   **

## 2019-01-15 NOTE — PROGRESS NOTES
Progress Note - Shayna Staggers 2000, 22 y o  male MRN: [id number]    Unit/Bed#: 420-01 Encounter: [id number]    Primary Care Provider: Js Stevens MD   Date and time admitted to hospital: 2019  5:29 PM        Effusion of left knee   Assessment & Plan    Patient had left knee aspiration by Dr Mccurdy Victoria  MRI pending  PRN pain management  * Acute pain of left knee   Assessment & Plan    Corinda Gareth onto left knee on 19  sustained lateral patella dislocation  seen in ED  is s/p closed reduction patella dislocation at bedside  Wearing knee immobilizer  swelling and pain increased 3 days ago  Ortho consultation appreciated  CT left lower ext: No evidence of vascular injury in the left leg  Complex fluid collection identified on venous Doppler earlier today likely corresponds to moderate-sized joint effusion of the left knee as no other fluid collection is seen  There is no hematoma  PRN pain control  MRI left lower ext pending           VTE Pharmacologic Prophylaxis:   Pharmacologic: Enoxaparin (Lovenox)  Mechanical VTE Prophylaxis in Place: Yes    Patient Centered Rounds: I have performed bedside rounds with nursing staff today  Discussions with Specialists or Other Care Team Provider: nursing, cm and attending      Time Spent for Care: 30 minutes  More than 50% of total time spent on counseling and coordination of care as described above  Current Length of Stay: 0 day(s)    Current Patient Status: Observation   Certification Statement: The patient will continue to require additional inpatient hospital stay due to continued workup of effusion/knee pain with MRI    Discharge Plan: TBD    Code Status: Level 1 - Full Code      Subjective: The patient was seen and examined  The patient continues to have severe left knee pain and is unable to pend it      Objective:     Vitals:   Temp (24hrs), Av 4 °F (36 9 °C), Min:98 °F (36 7 °C), Max:98 8 °F (37 1 °C)    Temp:  [98 °F (36 7 °C)-98 8 °F (37 1 °C)] 98 8 °F (37 1 °C)  HR:  [] 118  Resp:  [14-18] 16  BP: (103-153)/(66-89) 132/72  SpO2:  [94 %-99 %] 97 %  Body mass index is 28 54 kg/m²  Input and Output Summary (last 24 hours): Intake/Output Summary (Last 24 hours) at 01/15/19 1740  Last data filed at 01/15/19 1556   Gross per 24 hour   Intake             1360 ml   Output             3000 ml   Net            -1640 ml       Physical Exam:     Physical Exam   Constitutional: He is oriented to person, place, and time  Vital signs are normal  He appears well-developed and well-nourished  He is active and cooperative  Cardiovascular: Normal rate, regular rhythm and normal heart sounds  Pulmonary/Chest: Effort normal and breath sounds normal  He has no wheezes  He has no rhonchi  He has no rales  Abdominal: Soft  Normal appearance and bowel sounds are normal  There is no tenderness  Musculoskeletal:        Left knee: He exhibits decreased range of motion and swelling  Tenderness found  Neurological: He is alert and oriented to person, place, and time  No cranial nerve deficit  Skin: Skin is warm, dry and intact  Nursing note and vitals reviewed  Additional Data:     Labs:      Results from last 7 days  Lab Units 01/15/19  0551 01/14/19  1859   WBC Thousand/uL 9 89 13 06*   HEMOGLOBIN g/dL 13 1 14 2   HEMATOCRIT % 39 8 42 0   PLATELETS Thousands/uL 296 345   NEUTROS PCT %  --  78*   LYMPHS PCT %  --  10*   MONOS PCT %  --  9   EOS PCT %  --  2       Results from last 7 days  Lab Units 01/15/19  0551   SODIUM mmol/L 138   POTASSIUM mmol/L 3 7   CHLORIDE mmol/L 101   CO2 mmol/L 29   BUN mg/dL 13   CREATININE mg/dL 0 98   ANION GAP mmol/L 8   CALCIUM mg/dL 9 1   GLUCOSE RANDOM mg/dL 95       Results from last 7 days  Lab Units 01/14/19  1859   INR  0 97                       * I Have Reviewed All Lab Data Listed Above  * Additional Pertinent Lab Tests Reviewed:  All Labs Within Last 24 Hours Reviewed    Imaging:    Imaging Reports Reviewed Today Include: CT left lower ext  Imaging Personally Reviewed by Myself Includes:  none    Recent Cultures (last 7 days):           Last 24 Hours Medication List:     Current Facility-Administered Medications:  acetaminophen 650 mg Oral Q4H PRN Mode Kent PA-C   influenza vaccine 0 5 mL Intramuscular Prior to discharge Mode Kent PA-C   nicotine 1 patch Transdermal Daily SCOT Mejia   oxyCODONE 10 mg Oral Q4H PRN Mode Kent PA-C   oxyCODONE 5 mg Oral Q4H PRN Mode Kent PA-C        Today, Patient Was Seen By: Mode Kent PA-C    ** Please Note: Dictation voice to text software may have been used in the creation of this document   **

## 2019-01-15 NOTE — CONSULTS
Consultation- Asif Addie 22 y o  [unfilled] MRN: [id number]  Unit/Bed#: 420-01      Chief Complaint:  Left  Knee Pain    HPI:  Left knee pain and swelling  Patient was playing football and January 6  His dislocated his kneecap 10  Patient states relocated and was subsequently seen in the emergency room prior to the splint  Patient is ambulating without the splint when he developed pain swelling of the knee and the lower extremity and presented with a fever to the emergency room  The patient rates his pain as a 7/10 mainly located in the left knee  Review Of Systems:      Gen: Normal   Skin: Normal   Musculoskeletal: See HPI   All Other Systems Negative      Past Medical History:   Past Medical History:   Diagnosis Date    Fracture, clavicle     at birth   Clifm Lazaro Knee dislocation        Past Surgical History: History reviewed  No pertinent surgical history  Family [email or web address]    Social History:  Social History     Social History    Marital status: Single     Spouse name: N/A    Number of children: N/A    Years of education: N/A     Social History Main Topics    Smoking status: Current Every Day Smoker     Packs/day: 1 00     Years: 13 00    Smokeless tobacco: Never Used      Comment: Refused    Alcohol use No    Drug use: No    Sexual activity: Not Currently     Other Topics Concern    None     Social History Narrative    None       Allergies:    Allergies   Allergen Reactions    Aloe Burn Relief [Lidocaine]        Medications:   Current Facility-Administered Medications:     acetaminophen (TYLENOL) tablet 650 mg, 650 mg, Oral, Q4H PRN, Radha Helms PA-C    influenza vaccine, quadrivalent (FLULAVAL) IM injection 0 5 mL, 0 5 mL, Intramuscular, Prior to discharge, Radha Helms PA-C    nicotine (NICODERM CQ) 21 mg/24 hr TD 24 hr patch 1 patch, 1 patch, Transdermal, Daily, SCOT Mejia, 1 patch at 01/15/19 1205    oxyCODONE (ROXICODONE) IR tablet 5 mg, 5 mg, Oral, Q4H PRN, SERENITY Ashley-ANTONY, 5 mg at 01/15/19 1203    Physical Exam:     Vitals: /76 (BP Location: Left arm)   Pulse (!) 106   Temp 98 °F (36 7 °C) (Temporal)   Resp 18   Ht 5' 6" (1 676 m)   Wt 80 2 kg (176 lb 12 9 oz)   SpO2 97%   BMI 28 54 kg/m²     Psych:  Normal  Eyes: EOM intact, Eyes clear, PERRLA   ENT: Hearing intact, Mucous Membranes moist  Lungs: Clear, No Audible wheeze  Abdomen: Non-tender, Non-distended, Soft  Lymph: No Lymphadenopathy  Cardiovascular: No Discernable Arrhythmia  Musculoskeletal:  Left Knee  Skin intact   Moderate effusion present  Extensor mechanism clinically intact with the difficult to examine due to pain   Motor/Sensation intact  Palpable DP pulse  Radiology: I personally reviewed the films    X-ray left Knee:  Radiographs normal    Labs:    0  Lab Value Date/Time   HCT 39 8 01/15/2019 0551   HGB 13 1 01/15/2019 0551   INR 0 97 01/14/2019 1859   WBC 9 89 01/15/2019 0551        Assessment:  Left knee acute effusion status post trauma     Plan:  Discussed treatment with the patient  Left knee aspirated under aseptic precautions 30 cc blood tinged fluid obtained sent to the lab for cultures Gram stain and cell count and crystals  Will obtain an MRI of the left knee  Patient allowed to ambulate with a knee immobilizer

## 2019-01-15 NOTE — ED NOTES
Waiting for Dr Lety Andino to consult with patient to make decision on care before giving patient anything to eat       Brendon Weldon RN  01/15/19 6771

## 2019-01-15 NOTE — ASSESSMENT & PLAN NOTE
Ortho consulted  Non weight-bearing left lower extremity until Ortho evaluate  Provide analgesia as needed  Provide supportive care

## 2019-01-15 NOTE — UTILIZATION REVIEW
Initial Clinical Review    Admission: Date/Time/Statement: 01/15/19 @ 0012 Observation Written   Orders Placed This Encounter   Procedures    Place in Observation     Standing Status:   Standing     Number of Occurrences:   1     Order Specific Question:   Admitting Physician     Answer:   Summer Solis     Order Specific Question:   Level of Care     Answer:   Med Surg [16]     ED: Date/Time/Mode of Arrival:   ED Arrival Information     Expected Arrival Acuity Means of Arrival Escorted By Service Admission Type    1/14/2019 1/14/2019 17:29 Urgent Stretcher [facility] pass Ambulance General Medicine Urgent    Arrival Complaint    knee injury        Chief Complaint:   Chief Complaint   Patient presents with    Leg Pain     Left leg pain from the knee down, edema noted, started three days ago     History of Illness: Susie Scott is a 22 y o  male who presents with for evaluation of left lower extremity swelling and pain  Patient states that on January 8, 2019 DT was playing football fell in a ditch and hurt his left knee  Patient did receive treatment at [facility] ER, images and labs were collected  I patient has a brace and instructed to follow up Ortho  Patient reports he did not follow up with Ortho because he he walks everywhere he goes could is Ortho doctor  The patient is in extreme 10/10 pain  Patient will be admitted for observation and ortho consult for the consulted  ED Vital Signs:   ED Triage Vitals [01/14/19 1732]   Temperature Pulse Respirations Blood Pressure SpO2   98 2 °F (36 8 °C) (!) 134 20 149/76 98 %      Temp Source Heart Rate Source Patient Position - Orthostatic VS BP Location FiO2 (%)   Temporal Monitor Lying Right arm --      Pain Score       9        Wt Readings from Last 1 Encounters:   01/15/19 80 1 kg (176 lb 9 4 oz) (77 %, Z= 0 75)*     * Growth percentiles are based on CDC 2-20 Years data       Vital Signs (abnormal): -134  Pertinent Labs/Diagnostic Test Results: WBC 13 06, PHOS 5 2  VAS LOWER LIMB VENOUS DUPLEX STUDY:  CONCLUSION:   LEFT LOWER LIMB: No evidence of acute or chronic deep vein thrombosis No evidence of superficial thrombophlebitis noted  Doppler evaluation shows a normal response to augmentation maneuvers  Popliteal, posterior tibial and anterior tibial arterial Doppler waveforms are triphasic  Note: There is a complex fluid collection noted in the anterior medial knee measuring 5 0 x4 8 x1 4 cm  ED Treatment:   Medication Administration from 01/14/2019 1729 to 01/15/2019 0748       Date/Time Order Dose Route Action     01/14/2019 1906 morphine (PF) 4 mg/mL injection 4 mg 4 mg Intravenous Given     01/14/2019 1906 sodium chloride 0 9 % bolus 1,000 mL 1,000 mL Intravenous New Bag     01/14/2019 2036 iohexol (OMNIPAQUE) 350 MG/ML injection (MULTI-DOSE) 120 mL 120 mL Intravenous Given     01/14/2019 2138 morphine (PF) 4 mg/mL injection 4 mg 4 mg Intravenous Given     01/15/2019 0052 morphine (PF) 4 mg/mL injection 4 mg 4 mg Intravenous Given     01/15/2019 0217 HYDROmorphone (DILAUDID) injection 0 5 mg 0 5 mg Intravenous Given        Past Medical/Surgical History: Active Ambulatory Problems     Diagnosis Date Noted    No Active Ambulatory Problems     Resolved Ambulatory Problems     Diagnosis Date Noted    No Resolved Ambulatory Problems     Past Medical History:   Diagnosis Date    Knee dislocation      Admitting Diagnosis: Knee injury [S89 90XA]  Age/Sex: 22 y o  male  Assessment/Plan:   Knee effusion, left   Assessment & Plan     Ortho consulted  Non weight-bearing left lower extremity until Ortho evaluate  Provide analgesia as needed  Provide supportive care      Acute pain   Assessment & Plan     Alejandro Bernardo onto left knee on 1/8/19  sustained lateral patella dislocation  seen in ED  is s/p closed reduction patella dislocation at bedside  Wearing knee immobilizer  swelling and pain increased 3 days ago      Ortho consulted  Provide analgesia as needed  Non weight-bearing left lower extremity until ortho evaluation          VTE Prophylaxis: Low risk ambulate early  / reason for no mechanical VTE prophylaxis Low risk ambulate early    Anticipated Length of Stay:  Patient will be admitted on an Observation basis with an anticipated length of stay of  less than 2 midnights     Justification for Hospital Stay:  Left knee effusion     Admission Orders:  Ambulatory, weight bearing restrictions  Daily weights, I/O  CTA left lower limb extremity  Consult orthopedic sx, cm  Scheduled Meds:   Current Facility-Administered Medications:  nicotine 1 patch Transdermal Daily     Continuous Infusions:    PRN Meds:

## 2019-01-15 NOTE — ASSESSMENT & PLAN NOTE
Katina Power onto left knee on 1/8/19  sustained lateral patella dislocation  seen in ED  is s/p closed reduction patella dislocation at bedside  Wearing knee immobilizer  swelling and pain increased 3 days ago      Ortho consulted  Provide analgesia as needed  Non weight-bearing left lower extremity until ortho evaluation

## 2019-01-15 NOTE — ED NOTES
Pedal pulse present with doppler  Foot appears pink      Bruce Obando, RN  01/15/19 [address] RN  01/15/19 6403

## 2019-01-15 NOTE — PLAN OF CARE
DISCHARGE PLANNING     Discharge to home or other facility with appropriate resources Progressing        Knowledge Deficit     Patient/family/caregiver demonstrates understanding of disease process, treatment plan, medications, and discharge instructions Progressing        Nutrition/Hydration-ADULT     Nutrient/Hydration intake appropriate for improving, restoring or maintaining nutritional needs Progressing        PAIN - ADULT     Verbalizes/displays adequate comfort level or baseline comfort level Progressing        Potential for Falls     Patient will remain free of falls Progressing        Prexisting or High Potential for Compromised Skin Integrity     Skin integrity is maintained or improved Progressing        SAFETY ADULT     Maintain or return to baseline ADL function Progressing     Maintain or return mobility status to optimal level Progressing

## 2019-01-15 NOTE — ED NOTES
Doppler of the left dorasl side of left foot had audible blood flow        Marialuisa Mares RN  01/15/19 8837

## 2019-01-16 PROBLEM — S83.006A PATELLAR DISLOCATION: Status: ACTIVE | Noted: 2019-01-16

## 2019-01-16 LAB
ANION GAP SERPL CALCULATED.3IONS-SCNC: 10 MMOL/L (ref 4–13)
BASOPHILS # BLD AUTO: 0.06 THOUSANDS/ΜL (ref 0–0.1)
BASOPHILS NFR BLD AUTO: 1 % (ref 0–1)
BUN SERPL-MCNC: 14 MG/DL (ref 5–25)
CALCIUM SERPL-MCNC: 9.4 MG/DL (ref 8.3–10.1)
CHLORIDE SERPL-SCNC: 99 MMOL/L (ref 100–108)
CO2 SERPL-SCNC: 30 MMOL/L (ref 21–32)
CREAT SERPL-MCNC: 0.88 MG/DL (ref 0.6–1.3)
EOSINOPHIL # BLD AUTO: 0.27 THOUSAND/ΜL (ref 0–0.61)
EOSINOPHIL NFR BLD AUTO: 3 % (ref 0–6)
ERYTHROCYTE [DISTWIDTH] IN BLOOD BY AUTOMATED COUNT: 12.2 % (ref 11.6–15.1)
GFR SERPL CREATININE-BSD FRML MDRD: 125 ML/MIN/1.73SQ M
GLUCOSE P FAST SERPL-MCNC: 87 MG/DL (ref 65–99)
GLUCOSE SERPL-MCNC: 87 MG/DL (ref 65–140)
HCT VFR BLD AUTO: 41.6 % (ref 36.5–49.3)
HGB BLD-MCNC: 13.6 G/DL (ref 12–17)
IMM GRANULOCYTES # BLD AUTO: 0.02 THOUSAND/UL (ref 0–0.2)
IMM GRANULOCYTES NFR BLD AUTO: 0 % (ref 0–2)
LYMPHOCYTES # BLD AUTO: 2.55 THOUSANDS/ΜL (ref 0.6–4.47)
LYMPHOCYTES NFR BLD AUTO: 24 % (ref 14–44)
MCH RBC QN AUTO: 30.8 PG (ref 26.8–34.3)
MCHC RBC AUTO-ENTMCNC: 32.7 G/DL (ref 31.4–37.4)
MCV RBC AUTO: 94 FL (ref 82–98)
MONOCYTES # BLD AUTO: 1.11 THOUSAND/ΜL (ref 0.17–1.22)
MONOCYTES NFR BLD AUTO: 11 % (ref 4–12)
NEUTROPHILS # BLD AUTO: 6.46 THOUSANDS/ΜL (ref 1.85–7.62)
NEUTS SEG NFR BLD AUTO: 61 % (ref 43–75)
NRBC BLD AUTO-RTO: 0 /100 WBCS
PLATELET # BLD AUTO: 353 THOUSANDS/UL (ref 149–390)
PMV BLD AUTO: 9.2 FL (ref 8.9–12.7)
POTASSIUM SERPL-SCNC: 3.7 MMOL/L (ref 3.5–5.3)
RBC # BLD AUTO: 4.42 MILLION/UL (ref 3.88–5.62)
SODIUM SERPL-SCNC: 139 MMOL/L (ref 136–145)
WBC # BLD AUTO: 10.47 THOUSAND/UL (ref 4.31–10.16)

## 2019-01-16 PROCEDURE — 99217 PR OBSERVATION CARE DISCHARGE MANAGEMENT: CPT | Performed by: PHYSICIAN ASSISTANT

## 2019-01-16 PROCEDURE — G8979 MOBILITY GOAL STATUS: HCPCS | Performed by: PHYSICAL THERAPIST

## 2019-01-16 PROCEDURE — 99224 PR SBSQ OBSERVATION CARE/DAY 15 MINUTES: CPT | Performed by: PHYSICIAN ASSISTANT

## 2019-01-16 PROCEDURE — 97162 PT EVAL MOD COMPLEX 30 MIN: CPT | Performed by: PHYSICAL THERAPIST

## 2019-01-16 PROCEDURE — 97116 GAIT TRAINING THERAPY: CPT | Performed by: PHYSICAL THERAPIST

## 2019-01-16 PROCEDURE — G8978 MOBILITY CURRENT STATUS: HCPCS | Performed by: PHYSICAL THERAPIST

## 2019-01-16 PROCEDURE — 80048 BASIC METABOLIC PNL TOTAL CA: CPT | Performed by: PHYSICIAN ASSISTANT

## 2019-01-16 PROCEDURE — 85025 COMPLETE CBC W/AUTO DIFF WBC: CPT | Performed by: PHYSICIAN ASSISTANT

## 2019-01-16 RX ORDER — OXYCODONE HYDROCHLORIDE 5 MG/1
5 TABLET ORAL EVERY 4 HOURS PRN
Qty: 20 TABLET | Refills: 0 | Status: SHIPPED | OUTPATIENT
Start: 2019-01-16 | End: 2019-01-26

## 2019-01-16 RX ADMIN — OXYCODONE HYDROCHLORIDE 10 MG: 10 TABLET ORAL at 13:15

## 2019-01-16 RX ADMIN — OXYCODONE HYDROCHLORIDE 10 MG: 10 TABLET ORAL at 18:20

## 2019-01-16 RX ADMIN — OXYCODONE HYDROCHLORIDE 10 MG: 10 TABLET ORAL at 01:57

## 2019-01-16 RX ADMIN — NICOTINE 1 PATCH: 21 PATCH, EXTENDED RELEASE TRANSDERMAL at 09:29

## 2019-01-16 RX ADMIN — ENOXAPARIN SODIUM 40 MG: 40 INJECTION SUBCUTANEOUS at 09:29

## 2019-01-16 NOTE — ASSESSMENT & PLAN NOTE
Farzad Cortez onto left knee on 1/8/19  sustained lateral patella dislocation  seen in ED  is s/p closed reduction patella dislocation at bedside  Wearing knee immobilizer  swelling and pain increased 3 days ago  Ortho consultation appreciated  CT left lower ext: No evidence of vascular injury in the left leg  Complex fluid collection identified on venous Doppler earlier today likely corresponds to moderate-sized joint effusion of the left knee as no other fluid collection is seen  There is no hematoma  MRI left lower ext: IMPRESSION:   1   Recent lateral patellar dislocation  Complete femoral sided tear of the medial patellofemoral ligament (MPFL) with persistent mild lateral patellar subluxation and tilt    2   Kissing contusions and mild cortical dimpling deformities from patellar-lateral condyle impaction  Marrow edema at the site of MPFL avulsion  No osseous fragmentation    3   Partial-thickness interstitial PCL tearing involving approximately 1/3rd of the cross-sectional area  Mild posterior tibial translation    4   Complete fibular collateral ligament tear and popliteus strain    5   Attenuated appearance of the MCL suggests partial thickness tearing/sprain  Outpatient follow up with PT  Non weight bearing left leg with immobilizer in place  Outpatient follow up with PCP and Dr Dhruv argueta Rx  Of note, discharge held due to significant pain  Prescription given for oxycodone 5mg tablets #20, also ibuprofen 800mg every 8 hours - take with food RTC for 2 days, then may take q8h PRN

## 2019-01-16 NOTE — PLAN OF CARE
Problem: PHYSICAL THERAPY ADULT  Goal: Performs mobility at highest level of function for planned discharge setting  See evaluation for individualized goals  Outcome: Progressing  Prognosis: Good  Problem List: Decreased strength, Decreased range of motion, Decreased endurance, Impaired balance, Decreased mobility, Pain, Orthopedic restrictions, Impaired sensation  Assessment: Pt is a 22year old male presenting to [facility] after fall while playing football dislocating his L patella  Patella was relocated in ED after fall on 1/6 however returned to [facility] on 1/14 due to persistent pain and swelling L knee  Pt had MRI which showed MPFL tear and avulsion with marrow edema, lateral patella subluxation with tilt fibular collateral lig tear and popliteus strian with MCL sprain/partial thickness tear and partial PCL tear  Pt currently is NWB L LE in immobilizer and seen by PT for moderate complexity PT evaluation to assess current mobility level, for appropriate A D  and to assist with discharge planning  Pt presents with poor control of L LE and poor tolerance to movement due to pain  Pt with inability to perform SLR and with limited ankle df and decreased sensation to light touch lateral L foot at 4th and 5th toes  Pt requiring min(A) with L LE during scooting in chair and during repositioning in sitting  Pt with poor tolerance for ambulation due to difficulty maintaining NWB status L LE  Pt with inability to ambulate with crutches due to inability to control and clear L LE in immobilizer  Pt with difficulty ambulating with RW FWD but able to maintaining NWB with RW when mobilizing backward  Pt unable to negotiate stairs at this time and unsafe to return home  Pt is in need of continued activity in PT to improve pain strength ROM L hip and ankle balance endurance mobility transfers ambulation and stair negotiation   Barriers to discharge home include stairs, inaccessible living environment and need for 24 hour assistance  Recommendation: Home with family support     PT - OK to Discharge: No    See flowsheet documentation for full assessment

## 2019-01-16 NOTE — ASSESSMENT & PLAN NOTE
Patient had left knee aspiration by Dr Dina Rodriguez results pending  Outpatient follow up with Dr Carlyle Javier (ortho)

## 2019-01-16 NOTE — PHYSICAL THERAPY NOTE
Physical Therapy Evaluation    Patient Name: Joan Dent    FGIFM'M Date: 1/16/2019     Problem List  Patient Active Problem List   Diagnosis    Acute pain of left knee    Effusion of left knee    Patellar dislocation        Past Medical History  Past Medical History:   Diagnosis Date    Fracture, clavicle     at birth   Nico Abt Knee dislocation         Past Surgical History  History reviewed  No pertinent surgical history  01/16/19 1431   Note Type   Note type Eval/Treat   Pain Assessment   Pain Score 8   Pain Type Acute pain   Pain Location Leg;Knee   Pain Orientation Left   Home Living   Type of [address] [place] [address] Multi-level;Stairs to enter without rails;Bed/bath upstairs  (4 CHRISTINA no HR or 5 CHRISTINA no HR to basement, full flight w/ HR )   Bathroom Shower/Tub Tub/shower unit   Bathroom Toilet Standard   Home Equipment (no DME)   Additional Comments Pt states he will be going to Uncle's house and will be staying in basement however bathroom is on 2nd floor with 2 flights of stairs to bathroom one set with HR, one set without HR   Prior Function   Level of [place] Independent with ADLs and functional mobility  ((I) ambulation no A  D  before fall with knee injury)   Lives With Family  (mother/father)   ADL Assistance Independent   IADLs Independent   Falls in the last 6 months 1 to 4   Restrictions/Precautions   Weight Bearing Precautions Per Order Yes   LLE Weight Bearing Per Order NWB   Braces or Orthoses LE Immobilizer   Other Precautions Pain; Fall Risk   General   Family/Caregiver Present No   Cognition   Arousal/Participation Alert   Orientation Level Oriented X4   Following Commands Follows all commands and directions without difficulty   RLE Assessment   RLE Assessment WFL   LLE Assessment   LLE Assessment X  (unable to SLR, in immobilizer, ankle df limited)   Coordination   Sensation X   Light Touch   RLE Light Touch Grossly intact   LLE Light Touch Impaired   LLE Light Touch Comments decreased to light touch L foot 4th and 5th toes   Bed Mobility   Additional Comments pt OOB in chair when PT entered with immobilizer unstrapped  Fastened immobilizer to patient tolerance and instructed pt in proper strap positions which pt verbalized understanding  Trial axillary crutches adjusted to proper height however pt unable to ambulate with crutches and maintain NWB in immobilizer due to poor ability to control L LE  and inability to tolerate hip flex for advancement and swing phase of gait  Trialed RW for ambulation and pt able to advance L LE using toes with L LE resting on floor however still pt with poor ambulation tolerance at 5ft with RW  Pt also unable to perform hip hike on L LE for advancement  Pt able however to ambulate backward 5 ft with RW to chair with CGA no LOB  Transfers   Sit to Stand (CGA with axillary crutches or with RW)   Additional items Verbal cues; Increased time required;Armrests   Stand to Sit (CGA with axillary crutches or RW)   Additional items Increased time required;Verbal cues;Armrests   Stand pivot (CGA with RW)   Additional items Increased time required;Verbal cues   Additional Comments pt limited with transfers and ambulation due to poor control of L LE in immobilizer with increased pain L knee with all attempted movement at hip and ankle  Pt with fair balance in standing with no LOB with use of RW or axillary crutches however pt unable to safely ambulate maintaining NWB L LE and unable to negotiate stairs at this time  Ambulation/Elevation   Gait pattern Antalgic  (NWB L LE in immobilizer )   Gait Assistance (CGA)   Assistive Device Rolling walker; Axillary crutches   Distance 5ft forward and 5ft backward with trial of axillary crutches then RW  Pt unable to ambulate with crutches     Stair Management Assistance (unable to negotiate stairs at this time)   Stair Management Technique (recommend bumping up/down on buttocks with assist)   Balance   Static Sitting Good   Dynamic Sitting Fair +   Static Standing Fair +   Dynamic Standing Fair   Ambulatory Fair -  (all standing assessed with crutches and RW)   Endurance Deficit   Endurance Deficit Yes   Endurance Deficit Description poor tolerance for activity due to pain L knee with all attempted movement   Activity Tolerance   Activity Tolerance Patient limited by fatigue;Patient limited by pain   Assessment   Prognosis Good   Problem List Decreased strength;Decreased range of motion;Decreased endurance; Impaired balance;Decreased mobility;Pain;Orthopedic restrictions; Impaired sensation   Assessment Pt is a 22year old male presenting to [facility] after fall while playing football dislocating his L patella  Patella was relocated in ED after fall on 1/6 however returned to [facility] on 1/14 due to persistent pain and swelling L knee  Pt had MRI which showed MPFL tear and avulsion with marrow edema, lateral patella subluxation with tilt fibular collateral lig tear and popliteus strian with MCL sprain/partial thickness tear and partial PCL tear  Pt currently is NWB L LE in immobilizer and seen by PT for moderate complexity PT evaluation to assess current mobility level, for appropriate A D  and to assist with discharge planning  Pt presents with poor control of L LE and poor tolerance to movement due to pain  Pt with inability to perform SLR and with limited ankle df and decreased sensation to light touch lateral L foot at 4th and 5th toes  Pt requiring min(A) with L LE during scooting in chair and during repositioning in sitting  Pt with poor tolerance for ambulation due to difficulty maintaining NWB status L LE  Pt with inability to ambulate with crutches due to inability to control and clear L LE in immobilizer  Pt with difficulty ambulating with RW FWD but able to maintaining NWB with RW when mobilizing backward  Pt unable to negotiate stairs at this time and unsafe to return home  Pt is in need of continued activity in PT to improve pain strength ROM L hip and ankle balance endurance mobility transfers ambulation and stair negotiation  Barriers to discharge home include stairs, inaccessible living environment and need for 24 hour assistance  Goals   Patient Goals To decrease knee pain return home and return to baseline activity level   STG Expiration Date 01/23/19   Short Term Goal #1 Decrease pain L knee to 3-5/10 with movement to improve tolerance for all mobility  2  Improve L LE SLR to independent to improve all bed mobility and transfers to Supervision with RW   Short Term Goal #2 Improve standing and ambulatory balance to fair+ with RW to improve ambulation to 50ft with RW NWB L LE in immobilizer Supervision  Improve stair negotiation to 11 steps with HR bumping on buttocks min(A)x1   Plan   Treatment/Interventions Functional transfer training;LE strengthening/ROM; Therapeutic exercise;Elevations; Endurance training;Patient/family training;Bed mobility;Gait training   PT Frequency 5x/wk   Recommendation   Recommendation Home with family support   PT - OK to Discharge No   No SCD's, pt seated in chair with LE elevated and call bell in reach

## 2019-01-16 NOTE — ASSESSMENT & PLAN NOTE
Patient had left knee aspiration by Dr Keiko Guan results showing no growth  Outpatient follow up with Dr Toni Archuleta (ortho)

## 2019-01-16 NOTE — ASSESSMENT & PLAN NOTE
· Susie Edmond onto left knee on 1/8/19  sustained lateral patella dislocation  · seen in ED  is s/p closed reduction patella dislocation at bedside  · Orthopedic consolation appreciated  · CTA left lower extremity: No evidence of vascular injury in the left leg  Complex fluid collection identified on venous Doppler earlier today likely corresponds to moderate-sized joint effusion of the left knee as no other fluid collection is seen  There is no hematoma  · MRI left lower extremity: read still pending, Dr Lety Andino reviewed images and cleared patient for discharge home  · Patient given compression stockings and instructed to wear knee immobilizer when walking, WBAT   · Outpatient follow up physical therapy    · Outpatient follow up with Dr Lety Andino (ortho)

## 2019-01-16 NOTE — DISCHARGE SUMMARY
Discharge- Susie Scott 2000, 22 y o  male MRN: [id number]    Unit/Bed#: 420-01 Encounter: [id number]    Primary Care Provider: Ashwini Granger MD   Date and time admitted to hospital: 1/14/2019  5:29 PM        Patellar dislocation   Assessment & Plan    · Iesha Park onto left knee on 1/8/19  sustained lateral patella dislocation  · seen in ED  is s/p closed reduction patella dislocation at bedside  · Orthopedic consolation appreciated  · CTA left lower extremity: No evidence of vascular injury in the left leg  Complex fluid collection identified on venous Doppler earlier today likely corresponds to moderate-sized joint effusion of the left knee as no other fluid collection is seen  There is no hematoma  · MRI left lower extremity: IMPRESSION:   1   Recent lateral patellar dislocation  Complete femoral sided tear of the medial patellofemoral ligament (MPFL) with persistent mild lateral patellar subluxation and tilt    2   Kissing contusions and mild cortical dimpling deformities from patellar-lateral condyle impaction  Marrow edema at the site of MPFL avulsion  No osseous fragmentation    3   Partial-thickness interstitial PCL tearing involving approximately 1/3rd of the cross-sectional area  Mild posterior tibial translation    4   Complete fibular collateral ligament tear and popliteus strain    5   Attenuated appearance of the MCL suggests partial thickness tearing/sprain  · Patient given compression stockings and instructed to wear knee immobilizer and is non weight bearing left leg  · Outpatient follow up physical therapy  · Outpatient follow up with Dr Lesley Watkins (ortho) in 3 weeks to discuss likely surgery  Effusion of left knee   Assessment & Plan    Patient had left knee aspiration by Dr Jose Lozano results pending  Outpatient follow up with Dr Lesley Watkins (ortho)        * Acute pain of left knee   Assessment & Plan    Iesha Park onto left knee on 1/8/19  sustained lateral patella dislocation  seen in ED  is s/p closed reduction patella dislocation at bedside  Wearing knee immobilizer  swelling and pain increased 3 days ago  Ortho consultation appreciated  CT left lower ext: No evidence of vascular injury in the left leg  Complex fluid collection identified on venous Doppler earlier today likely corresponds to moderate-sized joint effusion of the left knee as no other fluid collection is seen  There is no hematoma  MRI left lower ext: IMPRESSION:   1   Recent lateral patellar dislocation  Complete femoral sided tear of the medial patellofemoral ligament (MPFL) with persistent mild lateral patellar subluxation and tilt    2   Kissing contusions and mild cortical dimpling deformities from patellar-lateral condyle impaction  Marrow edema at the site of MPFL avulsion  No osseous fragmentation    3   Partial-thickness interstitial PCL tearing involving approximately 1/3rd of the cross-sectional area  Mild posterior tibial translation    4   Complete fibular collateral ligament tear and popliteus strain    5   Attenuated appearance of the MCL suggests partial thickness tearing/sprain  Outpatient follow up with PT  Non weight bearing left leg  Outpatient follow up with PCP and Dr John Wade  Discharging Physician / Practitioner: Rach Zee PA-C  PCP: Raj Arango MD  Admission Date:   Admission Orders     Ordered        01/15/19 0012  Place in Observation (expected length of stay for this patient is less than two midnights)  Once         01/15/19 0014  Place in Observation  Once             Discharge Date: 01/16/19    Resolved Problems  Date Reviewed: 1/16/2019    None          Consultations During Hospital Stay:  · Orthopedic surgery    Procedures Performed:   · Aspiration of left knee effusion    Significant Findings / Test Results:   · CT left lower extremity: No evidence of vascular injury in the left leg   Complex fluid collection identified on venous Doppler earlier today likely corresponds to moderate-sized joint effusion of the left knee as no other fluid collection is seen  There is no hematoma  Incidental Findings:   · none     Test Results Pending at Discharge (will require follow up):   · MRI left lower extremity: outpatient follow up with Dr Herson Luz (ortho)  · Culture of fluid from effusion: outpatient follow up with PCP/Dr Herson Luz     Outpatient Tests Requested:  · none    Complications:  none    Reason for Admission: left knee pain/effusion    Hospital Course:     Claudia Potter is a 22 y o  male patient who originally presented to the hospital on 1/14/2019 due to left lower extremity swelling and pain  Patient states that on January 8, 2019 DT was playing football fell in a ditch and hurt his left knee  Patient did receive treatment at [facility] ER, images and labs were collected  I patient has a brace and instructed to follow up Ortho  Patient reports he did not follow up with Ortho because he he walks everywhere he goes could is Ortho doctor  The patient is in extreme 10/10 pain  Patient will be admitted for observation and ortho consult for the consulted  Please see above list of diagnoses and related plan for additional information  Condition at Discharge: good     Discharge Day Visit / Exam:     Subjective:    Vitals: Blood Pressure: 121/69 (01/16/19 0750)  Pulse: 84 (01/16/19 0750)  Temperature: 98 °F (36 7 °C) (01/16/19 0750)  Temp Source: Temporal (01/16/19 0750)  Respirations: 20 (01/16/19 0750)  Height: 5' 6" (167 6 cm) (01/15/19 0900)  Weight - Scale: 80 8 kg (178 lb 2 1 oz) (01/16/19 0600)  SpO2: 98 % (01/16/19 0750)  Exam:   Physical Exam   Constitutional: He is oriented to person, place, and time  He appears well-developed and well-nourished  HENT:   Head: Normocephalic and atraumatic  Cardiovascular: Normal rate, regular rhythm and normal heart sounds  Pulmonary/Chest: Effort normal and breath sounds normal    Abdominal: Soft  Bowel sounds are normal    Musculoskeletal: He exhibits tenderness (left knee)  Neurological: He is alert and oriented to person, place, and time  No cranial nerve deficit  Skin: Skin is warm and dry  No erythema  Nursing note and vitals reviewed  Discharge instructions/Information to patient and family:   See after visit summary for information provided to patient and family  Provisions for Follow-Up Care:  See after visit summary for information related to follow-up care and any pertinent home health orders  Disposition:     Home    For Discharges to [facility] SNF:   · Not Applicable to this Patient - Not Applicable to this Patient    Planned Readmission: no     Discharge Statement:  I spent 25 minutes discharging the patient  This time was spent on the day of discharge  I had direct contact with the patient on the day of discharge  Greater than 50% of the total time was spent examining patient, answering all patient questions, arranging and discussing plan of care with patient as well as directly providing post-discharge instructions  Additional time then spent on discharge activities  Discharge Medications:  See after visit summary for reconciled discharge medications provided to patient and family        ** Please Note: This note has been constructed using a voice recognition system **

## 2019-01-16 NOTE — NURSING NOTE
Patient refused to ambulate on evening shift 3p-11p  Patient stated he could not walk due to the pain in his left leg/knee  Patient educated on importance of ambulating

## 2019-01-16 NOTE — PROGRESS NOTES
Progress Note - Orthopedics   Joaquim Hernandez 22 y o  male MRN: [id number]  Unit/Bed#: 420-01      Subjective:    22 y o male s/p left knee aspiration yesterday post injury  He notes continued significant pain  He refused to ambulate on 3-11 shift  No acute events  Denies fevers chills, CP, SOB  Labs:    0  Lab Value Date/Time   HCT 41 6 01/16/2019 0449   HCT 39 8 01/15/2019 0551   HCT 42 0 01/14/2019 1859   HGB 13 6 01/16/2019 0449   HGB 13 1 01/15/2019 0551   HGB 14 2 01/14/2019 1859   INR 0 97 01/14/2019 1859   WBC 10 47 (H) 01/16/2019 0449   WBC 9 89 01/15/2019 0551   WBC 13 06 (H) 01/14/2019 1859     Meds:    Current Facility-Administered Medications:     acetaminophen (TYLENOL) tablet 650 mg, 650 mg, Oral, Q4H PRN, Cinthia Bermudez PA-C    enoxaparin (LOVENOX) subcutaneous injection 40 mg, 40 mg, Subcutaneous, Q24H [address], Noé Bolaños PA-C    influenza vaccine, quadrivalent (FLULAVAL) IM injection 0 5 mL, 0 5 mL, Intramuscular, Prior to discharge, Cinthia Bermudez PA-C    nicotine (NICODERM CQ) 21 mg/24 hr TD 24 hr patch 1 patch, 1 patch, Transdermal, Daily, Bridgette Villalobos, CRNP, 1 patch at 01/15/19 1205    oxyCODONE (ROXICODONE) immediate release tablet 10 mg, 10 mg, Oral, Q4H PRN, Cinthia Bermudez PA-C, 10 mg at 01/16/19 0157    oxyCODONE (ROXICODONE) IR tablet 5 mg, 5 mg, Oral, Q4H PRN, Cinthia Bermudez PA-C    Blood Culture:   No results found for: BLOODCX    Wound Culture:   No results found for: WOUNDCULT    Ins and Outs:  I/O last 24 hours: In: 600 [P O :600]  Out: 2625 [Urine:2625]    Physical:  Vitals:    01/15/19 2315   BP: 138/64   Pulse: 98   Resp: 20   Temp: 98 °F (36 7 °C)   SpO2: 98%     Musculoskeletal: left Lower Extremity  · Skin intact without erythema, nor gross signs of infection  Aspiration site covered with bandaid  · + effusion  · TTP about the entire knee but also down the shin, leg, and ankle where there is no swelling, effusion, redness    This makes pain seems out of proportion to clinical findings  ·  light touch sensation is intact  ·  cultures negative showing no bacteria but rare polys, no crystals, and synovial white blood cell count 1,538 only  ·  blood WBC count 10 47  ·  possible old puncture site/track marks anterior medial aspect right ankle near the saphenous vein    Assessment:    18 y o male status post blood tinged fluid aspirate left knee status post injury without gross signs of infection and cultures negative  Plan:  · WBAT in knee immobilizer pending final cultures  · PT/OT  · Pain control  · DVT ppx  · Dispo: Ortho will follow   ·  ice to left knee 20 minutes q i d    ·  MRI performed but awaiting official reading results    Kemal Jacobo PA-C

## 2019-01-16 NOTE — ASSESSMENT & PLAN NOTE
Harley Mediate onto left knee on 1/8/19  sustained lateral patella dislocation  seen in ED  is s/p closed reduction patella dislocation at bedside  Wearing knee immobilizer  swelling and pain increased 3 days ago  Ortho consultation appreciated  CT left lower ext: No evidence of vascular injury in the left leg  Complex fluid collection identified on venous Doppler earlier today likely corresponds to moderate-sized joint effusion of the left knee as no other fluid collection is seen  There is no hematoma  MRI left lower ext pending read  Images reviewed by Dr Yasmine Casillas who cleared patient for discharge home  Outpatient follow up with PT  Outpatient follow up with PCP and Dr Yasmine Casillas

## 2019-01-16 NOTE — ASSESSMENT & PLAN NOTE
· Jayleen Marquez onto left knee on 1/8/19  sustained lateral patella dislocation  · seen in ED at the time and was s/p closed reduction patella dislocation at bedside  · Orthopedic consolation appreciated  · CTA left lower extremity: No evidence of vascular injury in the left leg  Complex fluid collection identified on venous Doppler earlier today likely corresponds to moderate-sized joint effusion of the left knee as no other fluid collection is seen  There is no hematoma  · MRI left lower extremity: IMPRESSION:   1   Recent lateral patellar dislocation  Complete femoral sided tear of the medial patellofemoral ligament (MPFL) with persistent mild lateral patellar subluxation and tilt    2   Kissing contusions and mild cortical dimpling deformities from patellar-lateral condyle impaction  Marrow edema at the site of MPFL avulsion  No osseous fragmentation    3   Partial-thickness interstitial PCL tearing involving approximately 1/3rd of the cross-sectional area  Mild posterior tibial translation    4   Complete fibular collateral ligament tear and popliteus strain    5   Attenuated appearance of the MCL suggests partial thickness tearing/sprain  · Patient given compression stockings and instructed to wear knee immobilizer and is non weight bearing left leg  · Outpatient follow up physical therapy  · Outpatient follow up with Dr Devon Casey (ortho) in 3 weeks to discuss likely surgery

## 2019-01-16 NOTE — PLAN OF CARE
DISCHARGE PLANNING     Discharge to home or other facility with appropriate resources Progressing        Knowledge Deficit     Patient/family/caregiver demonstrates understanding of disease process, treatment plan, medications, and discharge instructions Progressing        MUSCULOSKELETAL - ADULT     Maintain proper alignment of affected body part Progressing        Nutrition/Hydration-ADULT     Nutrient/Hydration intake appropriate for improving, restoring or maintaining nutritional needs Progressing        PAIN - ADULT     Verbalizes/displays adequate comfort level or baseline comfort level Progressing        Potential for Falls     Patient will remain free of falls Progressing        Prexisting or High Potential for Compromised Skin Integrity     Skin integrity is maintained or improved Progressing        SAFETY ADULT     Maintain or return to baseline ADL function Progressing     Maintain or return mobility status to optimal level Progressing

## 2019-01-17 VITALS
BODY MASS INDEX: 28.88 KG/M2 | HEART RATE: 106 BPM | RESPIRATION RATE: 20 BRPM | DIASTOLIC BLOOD PRESSURE: 69 MMHG | TEMPERATURE: 97.7 F | OXYGEN SATURATION: 98 % | SYSTOLIC BLOOD PRESSURE: 138 MMHG | HEIGHT: 66 IN | WEIGHT: 179.68 LBS

## 2019-01-17 PROCEDURE — 97116 GAIT TRAINING THERAPY: CPT

## 2019-01-17 PROCEDURE — 97530 THERAPEUTIC ACTIVITIES: CPT

## 2019-01-17 PROCEDURE — 99225 PR SBSQ OBSERVATION CARE/DAY 25 MINUTES: CPT | Performed by: PHYSICIAN ASSISTANT

## 2019-01-17 RX ORDER — IBUPROFEN 800 MG/1
800 TABLET ORAL EVERY 8 HOURS PRN
Qty: 20 TABLET | Refills: 0 | Status: SHIPPED | OUTPATIENT
Start: 2019-01-17 | End: 2019-12-05

## 2019-01-17 RX ORDER — IBUPROFEN 800 MG/1
800 TABLET ORAL ONCE
Status: COMPLETED | OUTPATIENT
Start: 2019-01-17 | End: 2019-01-17

## 2019-01-17 RX ADMIN — OXYCODONE HYDROCHLORIDE 10 MG: 10 TABLET ORAL at 01:11

## 2019-01-17 RX ADMIN — IBUPROFEN 800 MG: 800 TABLET ORAL at 09:03

## 2019-01-17 RX ADMIN — NICOTINE 1 PATCH: 21 PATCH, EXTENDED RELEASE TRANSDERMAL at 09:03

## 2019-01-17 RX ADMIN — OXYCODONE HYDROCHLORIDE 10 MG: 10 TABLET ORAL at 14:56

## 2019-01-17 NOTE — UTILIZATION REVIEW
[address]  Utilization Review Department  Phone: [phone]; Fax [phone]  [email or web address]  ATTENTION: Please call with any questions or concerns to [phone]  and carefully listen to the prompts so that you are directed to the right person  Send all requests for admission clinical reviews, approved or denied determinations and any other requests to fax [phone]  All voicemails are confidential   Continued Stay Review    Date: 1/17/19  Vital Signs: /69 (BP Location: Right arm)   Pulse (!) 106   Temp 97 7 °F (36 5 °C) (Temporal)   Resp 20   Ht 5' 6" (1 676 m)   Wt 81 5 kg (179 lb 10 8 oz)   SpO2 98%   BMI 23 71 kg/m²      Assessment/Plan: 21 yo male s/p left knee aspiration post injury, continued significant pain per ortho consult, refusing to ambulate last evening  Medications:   Scheduled Meds:   Current Facility-Administered Medications:  acetaminophen 650 mg Oral Q4H PRN Li Nova PA-C   influenza vaccine 0 5 mL Intramuscular Prior to discharge Li Nova PA-C   nicotine 1 patch Transdermal Daily SCOT Mejia   oxyCODONE 10 mg Oral Q4H PRN Li Nova PA-C   oxyCODONE 5 mg Oral Q4H PRN Li Nova PA-C     Continuous Infusions:    PRN Meds:   acetaminophen    influenza vaccine    oxyCODONE po 1/17 x 1    oxyCODONE  Pertinent Labs/Diagnostic Results:   MRI 1/15:1  Recent lateral patellar dislocation  Complete femoral sided tear of the medial patellofemoral ligament (MPFL) with persistent mild lateral patellar subluxation and tilt      2   Kissing contusions and mild cortical dimpling deformities from patellar-lateral condyle impaction  Marrow edema at the site of MPFL avulsion  No osseous fragmentation      3   Partial-thickness interstitial PCL tearing involving approximately 1/3rd of the cross-sectional area  Mild posterior tibial translation      4    Complete fibular collateral ligament tear and popliteus strain      5  Attenuated appearance of the MCL suggests partial thickness tearing/sprain    Age/Sex: 22 y o  male   Discharge Plan: TBD      Ortho consult:  Assessment:    22 y o male status post blood tinged fluid aspirate left knee status post injury without gross signs of infection and cultures negative      Plan:  · WBAT in knee immobilizer pending final cultures  · PT/OT  · Pain control  · DVT ppx  · Dispo: Ortho will follow   ·  ice to left knee 20 minutes q i d    ·  MRI performed but awaiting official reading results

## 2019-01-17 NOTE — PROGRESS NOTES
Progress Note - Eda Mendez 2000, 22 y o  male MRN: [id number]    Unit/Bed#: 420-01 Encounter: [id number]    Primary Care Provider: Kati Pedroza MD   Date and time admitted to hospital: 1/14/2019  5:29 PM        Patellar dislocation   Assessment & Plan    · Katina Power onto left knee on 1/8/19  sustained lateral patella dislocation  · seen in ED at the time and was s/p closed reduction patella dislocation at bedside  · Orthopedic consolation appreciated  · CTA left lower extremity: No evidence of vascular injury in the left leg  Complex fluid collection identified on venous Doppler earlier today likely corresponds to moderate-sized joint effusion of the left knee as no other fluid collection is seen  There is no hematoma  · MRI left lower extremity: IMPRESSION:   1   Recent lateral patellar dislocation  Complete femoral sided tear of the medial patellofemoral ligament (MPFL) with persistent mild lateral patellar subluxation and tilt    2   Kissing contusions and mild cortical dimpling deformities from patellar-lateral condyle impaction  Marrow edema at the site of MPFL avulsion  No osseous fragmentation    3   Partial-thickness interstitial PCL tearing involving approximately 1/3rd of the cross-sectional area  Mild posterior tibial translation    4   Complete fibular collateral ligament tear and popliteus strain    5   Attenuated appearance of the MCL suggests partial thickness tearing/sprain  · Patient given compression stockings and instructed to wear knee immobilizer and is non weight bearing left leg  · Outpatient follow up physical therapy  · Outpatient follow up with Dr Aracely Wadsworth (ortho) in 3 weeks to discuss likely surgery  Effusion of left knee   Assessment & Plan    Patient had left knee aspiration by Dr Michael Causeyter results showing no growth  Outpatient follow up with Dr Aracely Wadsworth (ortho)  * Acute pain of left knee   Assessment & Plan    Fell onto left knee on 1/8/19  sustained lateral patella dislocation  seen in ED  is s/p closed reduction patella dislocation at bedside  Wearing knee immobilizer  swelling and pain increased 3 days ago  Ortho consultation appreciated  CT left lower ext: No evidence of vascular injury in the left leg  Complex fluid collection identified on venous Doppler earlier today likely corresponds to moderate-sized joint effusion of the left knee as no other fluid collection is seen  There is no hematoma  MRI left lower ext: IMPRESSION:   1   Recent lateral patellar dislocation  Complete femoral sided tear of the medial patellofemoral ligament (MPFL) with persistent mild lateral patellar subluxation and tilt    2   Kissing contusions and mild cortical dimpling deformities from patellar-lateral condyle impaction  Marrow edema at the site of MPFL avulsion  No osseous fragmentation    3   Partial-thickness interstitial PCL tearing involving approximately 1/3rd of the cross-sectional area  Mild posterior tibial translation    4   Complete fibular collateral ligament tear and popliteus strain    5   Attenuated appearance of the MCL suggests partial thickness tearing/sprain  Outpatient follow up with PT  Non weight bearing left leg with immobilizer in place  Outpatient follow up with PCP and Dr Shanel Durbin walker Rx  Of note, discharge held due to significant pain  Prescription given for oxycodone 5mg tablets #20, also ibuprofen 800mg every 8 hours - take with food RTC for 2 days, then may take q8h PRN  VTE Pharmacologic Prophylaxis:   Pharmacologic: low risk none needed  Mechanical VTE Prophylaxis in Place: No        Time Spent for Care: 30 minutes  More than 50% of total time spent on counseling and coordination of care as described above      Current Length of Stay: 0 day(s)    Current Patient Status: Observation   Certification Statement: The patient will continue to require additional inpatient hospital stay due to patient is discharged, please see discharge summary from yesterday  Discharge was delayed due to inability to perform safe ambulation with PT    Discharge Plan / Estimated Discharge Date: today      Code Status: Level 1 - Full Code      Subjective:   Patient notes severe left knee pain at times, which is improved with oxycodone  Otherwise feeling well  No CP, SOB, fevers  Objective:     Vitals:   Temp (24hrs), Av 7 °F (37 1 °C), Min:97 7 °F (36 5 °C), Max:99 9 °F (37 7 °C)    Temp:  [97 7 °F (36 5 °C)-99 9 °F (37 7 °C)] 97 7 °F (36 5 °C)  HR:  [103-106] 106  Resp:  [20] 20  BP: (119-138)/(60-83) 138/69  SpO2:  [95 %-98 %] 98 %  Body mass index is 23 71 kg/m²  Input and Output Summary (last 24 hours): Intake/Output Summary (Last 24 hours) at 19 1139  Last data filed at 19 0103   Gross per 24 hour   Intake              300 ml   Output             1050 ml   Net             -750 ml       Physical Exam:     Physical Exam   Constitutional: He is oriented to person, place, and time  He appears well-developed  No distress  Cardiovascular: Normal rate, regular rhythm and normal heart sounds  No murmur heard  Pulmonary/Chest: Effort normal and breath sounds normal  No respiratory distress  He has no wheezes  He has no rales  Abdominal: Soft  Musculoskeletal: He exhibits no edema  Immobilizer in place on left knee  Neurological: He is alert and oriented to person, place, and time  Skin: Skin is warm and dry  He is not diaphoretic  Vitals reviewed          Additional Data:   Labs:      Results from last 7 days  Lab Units 19  0449   WBC Thousand/uL 10 47*   HEMOGLOBIN g/dL 13 6   HEMATOCRIT % 41 6   PLATELETS Thousands/uL 353   NEUTROS PCT % 61   LYMPHS PCT % 24   MONOS PCT % 11   EOS PCT % 3       Results from last 7 days  Lab Units 19  0449   POTASSIUM mmol/L 3 7   CHLORIDE mmol/L 99*   CO2 mmol/L 30   BUN mg/dL 14   CREATININE mg/dL 0 88   CALCIUM mg/dL 9 4       Results from last 7 days  Lab Units 01/14/19  1859   INR  0 97       * I Have Reviewed All Lab Data Listed Above  * Additional Pertinent Lab Tests Reviewed: All Labs Within Last 24 Hours Reviewed    Imaging:  Imaging Reports Reviewed Today Include: no new imaging to review  Recent Cultures (last 7 days):       Results from last 7 days  Lab Units 01/15/19  1326   GRAM STAIN RESULT  Rare Polys  No bacteria seen  Rare Polys  No bacteria seen   BODY FLUID CULTURE, STERILE  No growth  No growth       Last 24 Hours Medication List:     Current Facility-Administered Medications:  acetaminophen 650 mg Oral Q4H PRN Tommas SERENITY Valle-ANTONY   influenza vaccine 0 5 mL Intramuscular Prior to discharge Jigna Valle PA-C   nicotine 1 patch Transdermal Daily SCOT Mejia   oxyCODONE 10 mg Oral Q4H PRN Tommas Cotton, PA-ANTONY   oxyCODONE 5 mg Oral Q4H PRN Tommas Leonard PAHANY        Today, Patient Was Seen By: Swapna Reveles PA-C    ** Please Note: Dragon 360 Dictation voice to text software may have been used in the creation of this document   **

## 2019-01-17 NOTE — PLAN OF CARE
Problem: PHYSICAL THERAPY ADULT  Goal: Performs mobility at highest level of function for planned discharge setting  See evaluation for individualized goals  Outcome: Progressing  Prognosis: Good  Problem List: Decreased strength, Decreased range of motion, Decreased endurance, Impaired balance, Decreased mobility, Orthopedic restrictions, Pain  Assessment: Pt  Currently performing bed mobility, tx and ambulation at (min-CGA ) x level of function  Utilizing RW with fair balance and stability  Pt  Has difficulty advancing LLE and maintaining NWB LLE  Pt is in need of continued activity in PT to improve strength balance endurance mobility transfers and ambulation with return to maximize LOF  From PT/mobility standpoint, recommendation at time of d/c would be home PT in order to promote return to PLOF and independence  Recommendation: Home PT     PT - OK to Discharge: No    See flowsheet documentation for full assessment

## 2019-01-17 NOTE — SOCIAL WORK
Pt for dc home today and plans are home with home PT  Referral to  [address] (who is in network with pt's insurance)  Waiting to hear back from [address] if they can accept pt

## 2019-01-17 NOTE — PHYSICAL THERAPY NOTE
PT Treatment note      01/17/19 1009   Pain Assessment   Pain Score 6   Pain Type Acute pain   Pain Location Knee;Leg   Pain Orientation Left   Restrictions/Precautions   Weight Bearing Precautions Per Order Yes   LLE Weight Bearing Per Order NWB   Braces or Orthoses LE Immobilizer   Other Precautions (Pain; Fall Risk)   General   Family/Caregiver Present No   Transfers   Sit to Stand (CGA)   Additional items Bedrails;Verbal cues   Stand to Sit (CGA)   Additional items Armrests; Verbal cues   Stand pivot (CGA)   Ambulation/Elevation   Gait pattern Antalgic   Gait Assistance (CGA)   Additional items Assist x 1;Verbal cues   Assistive Device Rolling walker   Distance 15' forward, 3 ft  backward   Balance   Static Sitting Good   Dynamic Sitting Fair +   Static Standing Fair +   Dynamic Standing Fair   Ambulatory Fair -   Endurance Deficit   Endurance Deficit Yes   Activity Tolerance   Activity Tolerance Patient limited by pain; Patient limited by fatigue   Assessment   Prognosis Good   Problem List Decreased strength;Decreased range of motion;Decreased endurance; Impaired balance;Decreased mobility;Orthopedic restrictions;Pain   Assessment Pt  Currently performing bed mobility, tx and ambulation at (min-CGA ) x level of function  Utilizing RW with fair balance and stability  Pt  Has difficulty advancing LLE and maintaining NWB LLE  Pt is in need of continued activity in PT to improve strength balance endurance mobility transfers and ambulation with return to maximize LOF  From PT/mobility standpoint, recommendation at time of d/c would be home PT in order to promote return to PLOF and independence  Plan   Treatment/Interventions Functional transfer training;LE strengthening/ROM; Therapeutic exercise; Endurance training;Elevations; Bed mobility;Gait training   Progress Slow progress, decreased activity tolerance   Recommendation   Recommendation Home PT   Pt   OOB in chair   with call bell within reach and alarm on at end of PT session  Discussed with Mati Ventura PT today's treatment and patient's current level of function for care coordination

## 2019-01-18 LAB
BACTERIA SPEC BFLD CULT: NO GROWTH
BACTERIA SPEC BFLD CULT: NO GROWTH
GRAM STN SPEC: NORMAL

## 2019-01-26 NOTE — DISCHARGE SUMMARY
Of note, the patient's discharge was delayed to 1/16/19 due to patient's inability to perform safe ambulation with PT just prior to anticipated discharge on 1/16/19  Therefore, discharge was delayed for one day  Please see my progress note from 1/17/19 for further details as well as discharge summary from 1/16/19 for further details

## 2019-02-06 ENCOUNTER — OFFICE VISIT (OUTPATIENT)
Dept: OBGYN CLINIC | Facility: CLINIC | Age: 19
End: 2019-02-06
Payer: COMMERCIAL

## 2019-02-06 ENCOUNTER — APPOINTMENT (OUTPATIENT)
Dept: RADIOLOGY | Facility: MEDICAL CENTER | Age: 19
End: 2019-02-06
Payer: COMMERCIAL

## 2019-02-06 ENCOUNTER — HOSPITAL ENCOUNTER (OUTPATIENT)
Dept: MRI IMAGING | Facility: HOSPITAL | Age: 19
Discharge: HOME | End: 2019-02-06
Attending: ORTHOPAEDIC SURGERY
Payer: COMMERCIAL

## 2019-02-06 VITALS
HEIGHT: 72 IN | SYSTOLIC BLOOD PRESSURE: 104 MMHG | HEART RATE: 139 BPM | WEIGHT: 166 LBS | DIASTOLIC BLOOD PRESSURE: 67 MMHG | BODY MASS INDEX: 22.48 KG/M2

## 2019-02-06 DIAGNOSIS — S83.005A DISLOCATED PATELLA, LEFT, INITIAL ENCOUNTER: Primary | ICD-10-CM

## 2019-02-06 DIAGNOSIS — S83.005A DISLOCATED PATELLA, LEFT, INITIAL ENCOUNTER: ICD-10-CM

## 2019-02-06 PROCEDURE — 99203 OFFICE O/P NEW LOW 30 MIN: CPT | Performed by: ORTHOPAEDIC SURGERY

## 2019-02-06 PROCEDURE — 73721 MRI JNT OF LWR EXTRE W/O DYE: CPT

## 2019-02-06 PROCEDURE — 73560 X-RAY EXAM OF KNEE 1 OR 2: CPT

## 2019-02-06 RX ORDER — IBUPROFEN 800 MG/1
800 TABLET ORAL EVERY 8 HOURS PRN
Refills: 0 | COMMUNITY
Start: 2019-01-17 | End: 2019-12-05

## 2019-02-06 RX ORDER — OXYCODONE HYDROCHLORIDE 5 MG/1
5 TABLET ORAL EVERY 4 HOURS PRN
Refills: 0 | COMMUNITY
Start: 2019-01-24 | End: 2019-12-05

## 2019-02-06 NOTE — PROGRESS NOTES
Chief Complaint  Left knee pain    History Of Presenting Illness  Brenda Moreau 2000 presents with left knee pain status post dislocated left patella  This was reduced closed in the emergency room  Patient presents for evaluation with radiographs  Knee has been placed in a knee mobilizer  Patient injured himself playing football a month ago  Current Medications  Current Outpatient Prescriptions   Medication Sig Dispense Refill    ibuprofen (MOTRIN) 800 mg tablet Take 800 mg by mouth every 8 (eight) hours as needed  0    oxyCODONE (ROXICODONE) 5 mg immediate release tablet Take 5 mg by mouth every 4 (four) hours as needed  0     No current facility-administered medications for this visit  Current Problems    Active Problems: There are no active problems to display for this patient  Review of Systems:    General: negative for - chills, fatigue, fever,  weight gain or weight loss  Psychological: negative for - anxiety, behavioral disorder, concentration difficulties      Past Medical History:   Past Medical History:   Diagnosis Date    Knee dislocation, left, initial encounter        Past Surgical History:   Past Surgical History:   Procedure Laterality Date    NO PAST SURGERIES         Family History:  Family history reviewed and non-contributory  Family History   Problem Relation Age of Onset    No Known Problems Mother     No Known Problems Father        Social History:  Social History     Social History    Marital status: Single     Spouse name: N/A    Number of children: N/A    Years of education: N/A     Social History Main Topics    Smoking status: Current Every Day Smoker     Packs/day: 0 50     Types: Cigarettes    Smokeless tobacco: Never Used    Alcohol use No    Drug use: No    Sexual activity: Not Asked     Other Topics Concern    None     Social History Narrative    None       Allergies:    Allergies   Allergen Reactions    Aloe Burn Relief [Lidocaine] Physical ExaminationBP 104/67   Pulse (!) 139   Ht 6' (1 829 m)   Wt 75 3 kg (166 lb)   BMI 22 51 kg/m²   Gen: Alert and oriented to person, place, time  HEENT: EOMI, eyes clear, moist mucus membranes, hearing intact      Orthopedic Exam  Left knee examination  No effusion in the knee  Tenderness around the peripatellar region especially inferior pole  Patient unable to do a straight leg raise  Patient will not bend the knee  Calf soft non-tender  No distal neurovascular deficits  Radiographs show satisfactory alignment of the patella          Impression  Left knee dislocated patella with extensor mechanism dysfunction            Plan    Will arrange for patient to get a stat MRI to rule out a injury to the extensor mechanism which will need surgical repair  Follow up next week with the MRI    Danuta Ochoa MD        Portions of the record may have been created with voice recognition software   Occasional wrong word or "sound a like" substitutions may have occurred due to the inherent limitations of voice recognition software   Read the chart carefully and recognize, using context, where substitutions have occurred

## 2019-12-04 ENCOUNTER — APPOINTMENT (EMERGENCY)
Dept: RADIOLOGY | Facility: HOSPITAL | Age: 19
End: 2019-12-04
Payer: COMMERCIAL

## 2019-12-04 ENCOUNTER — APPOINTMENT (EMERGENCY)
Dept: CT IMAGING | Facility: HOSPITAL | Age: 19
End: 2019-12-04
Payer: COMMERCIAL

## 2019-12-04 ENCOUNTER — HOSPITAL ENCOUNTER (EMERGENCY)
Facility: HOSPITAL | Age: 19
End: 2019-12-05
Attending: EMERGENCY MEDICINE | Admitting: EMERGENCY MEDICINE
Payer: COMMERCIAL

## 2019-12-04 DIAGNOSIS — R56.9 SEIZURE-LIKE ACTIVITY (HCC): Primary | ICD-10-CM

## 2019-12-04 DIAGNOSIS — R50.9 FEVER: ICD-10-CM

## 2019-12-04 DIAGNOSIS — R51.9 HEADACHE: ICD-10-CM

## 2019-12-04 LAB
ALBUMIN SERPL BCP-MCNC: 4.3 G/DL (ref 3.5–5)
ALP SERPL-CCNC: 57 U/L (ref 46–116)
ALT SERPL W P-5'-P-CCNC: 25 U/L (ref 12–78)
AMPHETAMINES SERPL QL SCN: NEGATIVE
ANION GAP SERPL CALCULATED.3IONS-SCNC: 9 MMOL/L (ref 4–13)
APAP SERPL-MCNC: <2 UG/ML (ref 10–20)
AST SERPL W P-5'-P-CCNC: 15 U/L (ref 5–45)
BACTERIA UR QL AUTO: ABNORMAL /HPF
BARBITURATES UR QL: NEGATIVE
BASOPHILS # BLD AUTO: 0.08 THOUSANDS/ΜL (ref 0–0.1)
BASOPHILS NFR BLD AUTO: 1 % (ref 0–1)
BENZODIAZ UR QL: NEGATIVE
BILIRUB SERPL-MCNC: 0.5 MG/DL (ref 0.2–1)
BILIRUB UR QL STRIP: NEGATIVE
BUN SERPL-MCNC: 6 MG/DL (ref 5–25)
CALCIUM SERPL-MCNC: 9 MG/DL (ref 8.3–10.1)
CHLORIDE SERPL-SCNC: 104 MMOL/L (ref 100–108)
CK MB SERPL-MCNC: 0.7 NG/ML (ref 0–5)
CK MB SERPL-MCNC: <1 % (ref 0–2.5)
CK SERPL-CCNC: 191 U/L (ref 39–308)
CLARITY UR: CLEAR
CO2 SERPL-SCNC: 26 MMOL/L (ref 21–32)
COCAINE UR QL: NEGATIVE
COLOR UR: YELLOW
CREAT SERPL-MCNC: 0.92 MG/DL (ref 0.6–1.3)
EOSINOPHIL # BLD AUTO: 0.09 THOUSAND/ΜL (ref 0–0.61)
EOSINOPHIL NFR BLD AUTO: 1 % (ref 0–6)
ERYTHROCYTE [DISTWIDTH] IN BLOOD BY AUTOMATED COUNT: 12.3 % (ref 11.6–15.1)
ETHANOL SERPL-MCNC: <3 MG/DL (ref 0–3)
FLUAV RNA NPH QL NAA+PROBE: NORMAL
FLUBV RNA NPH QL NAA+PROBE: NORMAL
GFR SERPL CREATININE-BSD FRML MDRD: 119 ML/MIN/1.73SQ M
GLUCOSE SERPL-MCNC: 101 MG/DL (ref 65–140)
GLUCOSE UR STRIP-MCNC: NEGATIVE MG/DL
HCT VFR BLD AUTO: 44.8 % (ref 36.5–49.3)
HGB BLD-MCNC: 15.3 G/DL (ref 12–17)
HGB UR QL STRIP.AUTO: NEGATIVE
IMM GRANULOCYTES # BLD AUTO: 0.07 THOUSAND/UL (ref 0–0.2)
IMM GRANULOCYTES NFR BLD AUTO: 1 % (ref 0–2)
KETONES UR STRIP-MCNC: NEGATIVE MG/DL
LACTATE SERPL-SCNC: 0.9 MMOL/L (ref 0.5–2)
LEUKOCYTE ESTERASE UR QL STRIP: ABNORMAL
LIPASE SERPL-CCNC: 82 U/L (ref 73–393)
LYMPHOCYTES # BLD AUTO: 1.79 THOUSANDS/ΜL (ref 0.6–4.47)
LYMPHOCYTES NFR BLD AUTO: 23 % (ref 14–44)
MAGNESIUM SERPL-MCNC: 2.1 MG/DL (ref 1.6–2.6)
MCH RBC QN AUTO: 31.7 PG (ref 26.8–34.3)
MCHC RBC AUTO-ENTMCNC: 34.2 G/DL (ref 31.4–37.4)
MCV RBC AUTO: 93 FL (ref 82–98)
METHADONE UR QL: NEGATIVE
MONOCYTES # BLD AUTO: 0.79 THOUSAND/ΜL (ref 0.17–1.22)
MONOCYTES NFR BLD AUTO: 10 % (ref 4–12)
NEUTROPHILS # BLD AUTO: 4.94 THOUSANDS/ΜL (ref 1.85–7.62)
NEUTS SEG NFR BLD AUTO: 64 % (ref 43–75)
NITRITE UR QL STRIP: NEGATIVE
NON-SQ EPI CELLS URNS QL MICRO: ABNORMAL /HPF
NRBC BLD AUTO-RTO: 0 /100 WBCS
OPIATES UR QL SCN: NEGATIVE
PCP UR QL: NEGATIVE
PH UR STRIP.AUTO: 7 [PH]
PLATELET # BLD AUTO: 277 THOUSANDS/UL (ref 149–390)
PMV BLD AUTO: 9.5 FL (ref 8.9–12.7)
POTASSIUM SERPL-SCNC: 3.4 MMOL/L (ref 3.5–5.3)
PROT SERPL-MCNC: 7.4 G/DL (ref 6.4–8.2)
PROT UR STRIP-MCNC: NEGATIVE MG/DL
RBC # BLD AUTO: 4.82 MILLION/UL (ref 3.88–5.62)
RBC #/AREA URNS AUTO: ABNORMAL /HPF
RSV RNA NPH QL NAA+PROBE: NORMAL
S PYO DNA THROAT QL NAA+PROBE: NORMAL
SALICYLATES SERPL-MCNC: 4.6 MG/DL (ref 3–20)
SODIUM SERPL-SCNC: 139 MMOL/L (ref 136–145)
SP GR UR STRIP.AUTO: <=1.005 (ref 1–1.03)
THC UR QL: POSITIVE
UROBILINOGEN UR QL STRIP.AUTO: 0.2 E.U./DL
WBC # BLD AUTO: 7.76 THOUSAND/UL (ref 4.31–10.16)
WBC #/AREA URNS AUTO: ABNORMAL /HPF

## 2019-12-04 PROCEDURE — 70496 CT ANGIOGRAPHY HEAD: CPT

## 2019-12-04 PROCEDURE — 83605 ASSAY OF LACTIC ACID: CPT | Performed by: EMERGENCY MEDICINE

## 2019-12-04 PROCEDURE — 70498 CT ANGIOGRAPHY NECK: CPT

## 2019-12-04 PROCEDURE — 80329 ANALGESICS NON-OPIOID 1 OR 2: CPT | Performed by: EMERGENCY MEDICINE

## 2019-12-04 PROCEDURE — 84146 ASSAY OF PROLACTIN: CPT | Performed by: EMERGENCY MEDICINE

## 2019-12-04 PROCEDURE — 36415 COLL VENOUS BLD VENIPUNCTURE: CPT | Performed by: EMERGENCY MEDICINE

## 2019-12-04 PROCEDURE — 85025 COMPLETE CBC W/AUTO DIFF WBC: CPT | Performed by: EMERGENCY MEDICINE

## 2019-12-04 PROCEDURE — 80320 DRUG SCREEN QUANTALCOHOLS: CPT | Performed by: EMERGENCY MEDICINE

## 2019-12-04 PROCEDURE — 99285 EMERGENCY DEPT VISIT HI MDM: CPT | Performed by: EMERGENCY MEDICINE

## 2019-12-04 PROCEDURE — 62270 DX LMBR SPI PNXR: CPT | Performed by: EMERGENCY MEDICINE

## 2019-12-04 PROCEDURE — 87651 STREP A DNA AMP PROBE: CPT | Performed by: EMERGENCY MEDICINE

## 2019-12-04 PROCEDURE — 81001 URINALYSIS AUTO W/SCOPE: CPT | Performed by: EMERGENCY MEDICINE

## 2019-12-04 PROCEDURE — 80307 DRUG TEST PRSMV CHEM ANLYZR: CPT | Performed by: EMERGENCY MEDICINE

## 2019-12-04 PROCEDURE — 82550 ASSAY OF CK (CPK): CPT | Performed by: EMERGENCY MEDICINE

## 2019-12-04 PROCEDURE — 96365 THER/PROPH/DIAG IV INF INIT: CPT

## 2019-12-04 PROCEDURE — 99285 EMERGENCY DEPT VISIT HI MDM: CPT

## 2019-12-04 PROCEDURE — 87040 BLOOD CULTURE FOR BACTERIA: CPT | Performed by: EMERGENCY MEDICINE

## 2019-12-04 PROCEDURE — 87631 RESP VIRUS 3-5 TARGETS: CPT | Performed by: EMERGENCY MEDICINE

## 2019-12-04 PROCEDURE — 96375 TX/PRO/DX INJ NEW DRUG ADDON: CPT

## 2019-12-04 PROCEDURE — 96361 HYDRATE IV INFUSION ADD-ON: CPT

## 2019-12-04 PROCEDURE — 83735 ASSAY OF MAGNESIUM: CPT | Performed by: EMERGENCY MEDICINE

## 2019-12-04 PROCEDURE — 82553 CREATINE MB FRACTION: CPT | Performed by: EMERGENCY MEDICINE

## 2019-12-04 PROCEDURE — 71046 X-RAY EXAM CHEST 2 VIEWS: CPT

## 2019-12-04 PROCEDURE — 83690 ASSAY OF LIPASE: CPT | Performed by: EMERGENCY MEDICINE

## 2019-12-04 PROCEDURE — 80053 COMPREHEN METABOLIC PANEL: CPT | Performed by: EMERGENCY MEDICINE

## 2019-12-04 PROCEDURE — 93005 ELECTROCARDIOGRAM TRACING: CPT

## 2019-12-04 RX ORDER — DIPHENHYDRAMINE HYDROCHLORIDE 50 MG/ML
50 INJECTION INTRAMUSCULAR; INTRAVENOUS ONCE
Status: COMPLETED | OUTPATIENT
Start: 2019-12-04 | End: 2019-12-05

## 2019-12-04 RX ORDER — CEFTRIAXONE SODIUM 2 G/50ML
2000 INJECTION, SOLUTION INTRAVENOUS ONCE
Status: COMPLETED | OUTPATIENT
Start: 2019-12-04 | End: 2019-12-04

## 2019-12-04 RX ORDER — LORAZEPAM 1 MG/1
1 TABLET ORAL ONCE AS NEEDED
Status: DISCONTINUED | OUTPATIENT
Start: 2019-12-04 | End: 2019-12-05

## 2019-12-04 RX ORDER — DIPHENHYDRAMINE HYDROCHLORIDE 50 MG/ML
25 INJECTION INTRAMUSCULAR; INTRAVENOUS ONCE
Status: DISCONTINUED | OUTPATIENT
Start: 2019-12-04 | End: 2019-12-04

## 2019-12-04 RX ORDER — OLANZAPINE 5 MG/1
5 TABLET, ORALLY DISINTEGRATING ORAL ONCE AS NEEDED
Status: DISCONTINUED | OUTPATIENT
Start: 2019-12-04 | End: 2019-12-05

## 2019-12-04 RX ORDER — MORPHINE SULFATE 10 MG/ML
6 INJECTION, SOLUTION INTRAMUSCULAR; INTRAVENOUS ONCE
Status: COMPLETED | OUTPATIENT
Start: 2019-12-04 | End: 2019-12-04

## 2019-12-04 RX ADMIN — MORPHINE SULFATE 6 MG: 10 INJECTION INTRAVENOUS at 22:45

## 2019-12-04 RX ADMIN — IOHEXOL 100 ML: 350 INJECTION, SOLUTION INTRAVENOUS at 22:05

## 2019-12-04 RX ADMIN — CEFTRIAXONE SODIUM 2000 MG: 2 INJECTION, SOLUTION INTRAVENOUS at 23:02

## 2019-12-04 RX ADMIN — SODIUM CHLORIDE 1000 ML: 0.9 INJECTION, SOLUTION INTRAVENOUS at 21:47

## 2019-12-05 ENCOUNTER — HOSPITAL ENCOUNTER (INPATIENT)
Facility: HOSPITAL | Age: 19
LOS: 2 days | Discharge: HOME | DRG: 053 | End: 2019-12-07
Attending: INTERNAL MEDICINE | Admitting: INTERNAL MEDICINE
Payer: COMMERCIAL

## 2019-12-05 ENCOUNTER — APPOINTMENT (INPATIENT)
Dept: NEUROLOGY | Facility: CLINIC | Age: 19
DRG: 053 | End: 2019-12-05
Payer: COMMERCIAL

## 2019-12-05 VITALS
BODY MASS INDEX: 23.83 KG/M2 | RESPIRATION RATE: 16 BRPM | SYSTOLIC BLOOD PRESSURE: 96 MMHG | HEART RATE: 65 BPM | DIASTOLIC BLOOD PRESSURE: 51 MMHG | WEIGHT: 175.71 LBS | OXYGEN SATURATION: 96 % | TEMPERATURE: 98.6 F

## 2019-12-05 DIAGNOSIS — R56.9 NEW ONSET SEIZURE (HCC): Primary | ICD-10-CM

## 2019-12-05 DIAGNOSIS — F63.9 IMPULSE CONTROL DISORDER IN ADULT: ICD-10-CM

## 2019-12-05 LAB
APPEARANCE CSF: NORMAL
ATRIAL RATE: 79 BPM
C GATTII+NEOFOR DNA CSF QL NAA+NON-PROBE: NOT DETECTED
CMV DNA CSF QL NAA+NON-PROBE: NOT DETECTED
E COLI K1 DNA CSF QL NAA+NON-PROBE: NOT DETECTED
EV RNA CSF QL NAA+NON-PROBE: NOT DETECTED
GLUCOSE CSF-MCNC: 60 MG/DL (ref 50–80)
GLUCOSE SERPL-MCNC: 90 MG/DL (ref 65–140)
GP B STREP DNA CSF QL NAA+NON-PROBE: NOT DETECTED
GRAM STN SPEC: NORMAL
HAEM INFLU DNA CSF QL NAA+NON-PROBE: NOT DETECTED
HHV6 DNA CSF QL NAA+NON-PROBE: NOT DETECTED
HSV1 DNA CSF QL NAA+NON-PROBE: NOT DETECTED
HSV2 DNA CSF QL NAA+NON-PROBE: NOT DETECTED
L MONOCYTOG DNA CSF QL NAA+NON-PROBE: NOT DETECTED
N MEN DNA CSF QL NAA+NON-PROBE: NOT DETECTED
P AXIS: 70 DEGREES
PARECHOVIRUS A RNA CSF QL NAA+NON-PROBE: NOT DETECTED
PR INTERVAL: 154 MS
PROLACTIN SERPL-MCNC: 8.4 NG/ML (ref 2.5–17.4)
PROT CSF-MCNC: 42 MG/DL (ref 15–45)
QRS AXIS: 79 DEGREES
QRSD INTERVAL: 86 MS
QT INTERVAL: 358 MS
QTC INTERVAL: 410 MS
RBC # CSF MANUAL: 0 UL (ref 0–10)
RBC # CSF MANUAL: 0 UL (ref 0–10)
S PNEUM DNA CSF QL NAA+NON-PROBE: NOT DETECTED
T WAVE AXIS: 49 DEGREES
TOTAL CELLS COUNTED BLD: NO
TROPONIN I SERPL-MCNC: <0.02 NG/ML
TUBE # CSF: 4
VENTRICULAR RATE: 79 BPM
VZV DNA CSF QL NAA+NON-PROBE: NOT DETECTED
WBC # CSF AUTO: 0 /UL (ref 0–5)

## 2019-12-05 PROCEDURE — 99285 EMERGENCY DEPT VISIT HI MDM: CPT

## 2019-12-05 PROCEDURE — 36415 COLL VENOUS BLD VENIPUNCTURE: CPT | Performed by: EMERGENCY MEDICINE

## 2019-12-05 PROCEDURE — 96376 TX/PRO/DX INJ SAME DRUG ADON: CPT

## 2019-12-05 PROCEDURE — 87070 CULTURE OTHR SPECIMN AEROBIC: CPT | Performed by: EMERGENCY MEDICINE

## 2019-12-05 PROCEDURE — 87483 CNS DNA AMP PROBE TYPE 12-25: CPT | Performed by: EMERGENCY MEDICINE

## 2019-12-05 PROCEDURE — 93010 ELECTROCARDIOGRAM REPORT: CPT | Performed by: INTERNAL MEDICINE

## 2019-12-05 PROCEDURE — 95816 EEG AWAKE AND DROWSY: CPT

## 2019-12-05 PROCEDURE — 84157 ASSAY OF PROTEIN OTHER: CPT | Performed by: EMERGENCY MEDICINE

## 2019-12-05 PROCEDURE — 82945 GLUCOSE OTHER FLUID: CPT | Performed by: EMERGENCY MEDICINE

## 2019-12-05 PROCEDURE — 89050 BODY FLUID CELL COUNT: CPT | Performed by: EMERGENCY MEDICINE

## 2019-12-05 PROCEDURE — 96367 TX/PROPH/DG ADDL SEQ IV INF: CPT

## 2019-12-05 PROCEDURE — 99223 1ST HOSP IP/OBS HIGH 75: CPT | Performed by: INTERNAL MEDICINE

## 2019-12-05 PROCEDURE — NC001 PR NO CHARGE: Performed by: EMERGENCY MEDICINE

## 2019-12-05 PROCEDURE — 82948 REAGENT STRIP/BLOOD GLUCOSE: CPT

## 2019-12-05 PROCEDURE — 84484 ASSAY OF TROPONIN QUANT: CPT | Performed by: EMERGENCY MEDICINE

## 2019-12-05 PROCEDURE — 89051 BODY FLUID CELL COUNT: CPT | Performed by: EMERGENCY MEDICINE

## 2019-12-05 PROCEDURE — 87040 BLOOD CULTURE FOR BACTERIA: CPT | Performed by: EMERGENCY MEDICINE

## 2019-12-05 PROCEDURE — 96375 TX/PRO/DX INJ NEW DRUG ADDON: CPT

## 2019-12-05 PROCEDURE — 96366 THER/PROPH/DIAG IV INF ADDON: CPT

## 2019-12-05 PROCEDURE — 95951 HB EEG MONITORING/VIDEORECORD: CPT

## 2019-12-05 PROCEDURE — 96374 THER/PROPH/DIAG INJ IV PUSH: CPT

## 2019-12-05 PROCEDURE — 99255 IP/OBS CONSLTJ NEW/EST HI 80: CPT | Performed by: PSYCHIATRY & NEUROLOGY

## 2019-12-05 RX ORDER — LORAZEPAM 2 MG/ML
2 INJECTION INTRAMUSCULAR ONCE
Status: COMPLETED | OUTPATIENT
Start: 2019-12-05 | End: 2019-12-05

## 2019-12-05 RX ORDER — NICOTINE 21 MG/24HR
1 PATCH, TRANSDERMAL 24 HOURS TRANSDERMAL DAILY
Status: DISCONTINUED | OUTPATIENT
Start: 2019-12-06 | End: 2019-12-07 | Stop reason: HOSPADM

## 2019-12-05 RX ORDER — LORAZEPAM 2 MG/ML
2 INJECTION INTRAMUSCULAR
Status: DISCONTINUED | OUTPATIENT
Start: 2019-12-05 | End: 2019-12-07 | Stop reason: HOSPADM

## 2019-12-05 RX ORDER — LORAZEPAM 2 MG/ML
INJECTION INTRAMUSCULAR
Status: COMPLETED
Start: 2019-12-05 | End: 2019-12-05

## 2019-12-05 RX ORDER — ACETAMINOPHEN 325 MG/1
650 TABLET ORAL EVERY 6 HOURS PRN
Status: DISCONTINUED | OUTPATIENT
Start: 2019-12-05 | End: 2019-12-07 | Stop reason: HOSPADM

## 2019-12-05 RX ORDER — NICOTINE 21 MG/24HR
21 PATCH, TRANSDERMAL 24 HOURS TRANSDERMAL ONCE
Status: DISCONTINUED | OUTPATIENT
Start: 2019-12-05 | End: 2019-12-05 | Stop reason: HOSPADM

## 2019-12-05 RX ORDER — MORPHINE SULFATE 10 MG/ML
6 INJECTION, SOLUTION INTRAMUSCULAR; INTRAVENOUS ONCE
Status: COMPLETED | OUTPATIENT
Start: 2019-12-05 | End: 2019-12-05

## 2019-12-05 RX ORDER — NICOTINE 21 MG/24HR
1 PATCH, TRANSDERMAL 24 HOURS TRANSDERMAL DAILY
Status: DISCONTINUED | OUTPATIENT
Start: 2019-12-05 | End: 2019-12-05

## 2019-12-05 RX ORDER — ACETAMINOPHEN 325 MG/1
650 TABLET ORAL ONCE
Status: COMPLETED | OUTPATIENT
Start: 2019-12-05 | End: 2019-12-05

## 2019-12-05 RX ADMIN — LEVETIRACETAM 750 MG: 100 INJECTION, SOLUTION INTRAVENOUS at 21:32

## 2019-12-05 RX ADMIN — LORAZEPAM 2 MG: 2 INJECTION INTRAMUSCULAR; INTRAVENOUS at 18:13

## 2019-12-05 RX ADMIN — WATER 10 ML: 1 INJECTION INTRAMUSCULAR; INTRAVENOUS; SUBCUTANEOUS at 00:01

## 2019-12-05 RX ADMIN — LORAZEPAM 2 MG: 2 INJECTION INTRAMUSCULAR; INTRAVENOUS at 08:37

## 2019-12-05 RX ADMIN — ACETAMINOPHEN 650 MG: 325 TABLET, FILM COATED ORAL at 02:07

## 2019-12-05 RX ADMIN — LORAZEPAM 2 MG: 2 INJECTION INTRAMUSCULAR; INTRAVENOUS at 19:15

## 2019-12-05 RX ADMIN — MORPHINE SULFATE 6 MG: 10 INJECTION INTRAVENOUS at 02:07

## 2019-12-05 RX ADMIN — NICOTINE 21 MG: 21 PATCH TRANSDERMAL at 03:10

## 2019-12-05 RX ADMIN — LEVETIRACETAM 750 MG: 100 INJECTION, SOLUTION INTRAVENOUS at 11:03

## 2019-12-05 RX ADMIN — SODIUM CHLORIDE 1000 ML: 0.9 INJECTION, SOLUTION INTRAVENOUS at 01:32

## 2019-12-05 RX ADMIN — LORAZEPAM 2 MG: 2 INJECTION INTRAMUSCULAR; INTRAVENOUS at 08:38

## 2019-12-05 RX ADMIN — DIPHENHYDRAMINE HYDROCHLORIDE 50 MG: 50 INJECTION, SOLUTION INTRAMUSCULAR; INTRAVENOUS at 00:01

## 2019-12-05 RX ADMIN — LEVETIRACETAM 3000 MG: 500 INJECTION, SOLUTION, CONCENTRATE INTRAVENOUS at 03:26

## 2019-12-05 RX ADMIN — VANCOMYCIN HYDROCHLORIDE 2000 MG: 1 INJECTION, POWDER, LYOPHILIZED, FOR SOLUTION INTRAVENOUS at 01:34

## 2019-12-05 NOTE — ED PROVIDER NOTES
Patient was transferred to the emergency department for seizure activity and further monitoring  Patient arrived to the emergency department and I was called to patient's bedside for active seizure activity  Patient having a full tonic-clonic seizure activity  Ativan 2 mg was administered but seizure persisted, additional 2 mg of Ativan was administered with cessation of seizure activity  The admitting service was notified       Kristie Acosta DO  12/05/19 [id number]

## 2019-12-05 NOTE — ED NOTES
Pt states he had a seizure when he was 14years old  Played football       Micah Leo RN  10/96/84 5134

## 2019-12-05 NOTE — ED NOTES
ED RN in room to assess pt neuro status and pt started actively seizing   ED phsyicians Dr pOal Toribio, Dr Prisca Huynh, and Dr William Bonner brought to bedside      Thania Garay RN  12/05/19 6048

## 2019-12-05 NOTE — ED NOTES
NRB removed  Pt mainainting own airway and O2 sat 100% on RA   Pt now awake and speaking with ED RN- alert and oriented X4 GCS Amy Ruslan [address], RN  12/05/19 Indira Victor

## 2019-12-05 NOTE — LETTER
[address] [facility] [address]  [address]  [place] [address]  Dept: [phone]    December 6, 2019     Patient: Darvin Zavala   YOB: 2000   Date of Visit: 12/5/2019       To Whom it May Concern:    Laura Tesfaye is currently under my professional care  He has been in the hospital since 12/5/2019 and continues to require inpatient medical care  Discharge date is to be determined  If you have any questions or concerns, please don't hesitate to call           Sincerely,          Mohinder Escobar MD  [address] [facility]

## 2019-12-05 NOTE — H&P
INTERNAL MEDICINE RESIDENCY ADMISSION H&P     Name: Aime Rebolledo   Age & Sex: 20 y o  male   MRN: [id number]  Unit/Bed#: ED 15   Encounter: [id number]  Primary Care Provider: No primary care provider on file  Code Status: Level 1 - Full Code  Admission Status: INPATIENT   Disposition: Patient requires Med/Surg    ASSESSMENT/PLAN     Principal Problem:    New onset seizure ([address] )      * New onset seizure ([address] )  Assessment & Plan  · New onset seizures while at [facility]  Patient was transferred to [facility] for further workup  Patient received 3 g IV Keppra while at [facility] prior to transfer  · Patient received 2 g IV Ativan while in the emergency department as he was noted to have tonic-clonic seizures and received another 2 g IV Ativan with cessation of seizures  · Talked with patient's father in regards to seizure history in the family and he denies any seizures in him or patient's mother  There was seizure history and maternal grandfather  No seizure history in brothers or sisters  · Chest x-ray-no acute cardiopulmonary disease  · TSH ordered  · UDS-positive for marijuana  · Meningitis panel negative  · CSF studies  · Gram stain no polyps or bacteria seen  · Gram stain and culture pending  · Total protein 42  · White cell count 0  · Glucose 60  · Blood culture x2 pending  · Keppra 750 mg q 12 hours  · Ativan 2 mg Q 5 minutes for seizure activity lasting longer than 2 minutes  · Seizure precautions  · Continuous video EEG monitoring   · MRI brain ordered  · Neurology consulted      VTE Pharmacologic Prophylaxis: Reason for no pharmacologic prophylaxis low risk  VTE Mechanical Prophylaxis: sequential compression device    CHIEF COMPLAINT     Chief Complaint   Patient presents with    Seizure - Prior Hx Of     Pt is SOD transfer for Seizure  Per EMS transfer, pt had witnessed "upper body" seizure right after they left [place]  Lasting about 30 seconds    No medication given my EMS  Pt c/o headache and chest pain  HISTORY OF PRESENT ILLNESS   24-year-old male with no significant past medical history  Patient presented to [address] [place] [address],[address] secondary to seizures  Per chart review patient was noted to have 8 episodes of seizures today  These episodes were noted as full body tonic-clonic seizures and after each episode patient was slightly disoriented, but quickly returned to his baseline  During all of these episodes patient was not incontinent and Systane no intraoral injuries  Patient was transferred from [address] [place] [address],[address] to [facility] for video EEG monitoring  Patient was loaded with 3 g Keppra before transfer, and also underwent lumbar puncture  Once arriving to [facility] patient was noted to have seizure episode and received 2 g Ativan with no resolution of seizure and then another additional 2 g Ativan after with resolution of seizure  Upon my examination patient was somnolent as he had just received a total of 4 g IV Ativan  Patient was unarousable and was unable to discussed with patient  All patient's vital signs were stable during examination  REVIEW OF SYSTEMS     Review of Systems   Unable to perform ROS: Patient unresponsive     OBJECTIVE     Vitals:    19 0826 19 0900 19 0930 19 1000   BP: 119/56 103/55 95/53 99/52   BP Location: Right arm   Right arm   Pulse: 67 79 80 67   Resp: 15 15 15 18   Temp: 97 6 °F (36 4 °C)      TempSrc: Oral      SpO2: 98% 96% 97% 99%   Weight: 77 1 kg (170 lb)         Temperature:   Temp (24hrs), Av 1 °F (36 7 °C), Min:97 6 °F (36 4 °C), Max:98 6 °F (37 °C)    Temperature: 97 6 °F (36 4 °C)  Intake & Output:  I/O     None        Weights:   IBW: -88 kg    Body mass index is 23 06 kg/m²    Weight (last 2 days)     Date/Time   Weight    19 0826   77 1 (170)            Physical Exam   Constitutional: He appears well-developed and well-nourished  No distress  Patient somnolent   HENT:   Head: Normocephalic and atraumatic  Eyes: Pupils are equal, round, and reactive to light  Conjunctivae are normal  No scleral icterus  Cardiovascular: Normal rate, regular rhythm and normal heart sounds  Exam reveals no gallop and no friction rub  No murmur heard  Pulmonary/Chest: Effort normal and breath sounds normal  No respiratory distress  He has no wheezes  He has no rales  Abdominal: Soft  Bowel sounds are normal  He exhibits no distension  There is no tenderness  Musculoskeletal: Normal range of motion  He exhibits no edema  Neurological:   Unable to assess secondary to somnolence   Skin: Skin is warm  No rash noted  Nursing note and vitals reviewed  PAST MEDICAL HISTORY     Past Medical History:   Diagnosis Date    Fracture, clavicle     at birth   [place] Knee dislocation     Knee dislocation, left, initial encounter     Seizures ([address] )      PAST SURGICAL HISTORY     Past Surgical History:   Procedure Laterality Date    NO PAST SURGERIES       SOCIAL & FAMILY HISTORY     Social History     Substance and Sexual Activity   Alcohol Use No     Substance and Sexual Activity   Alcohol Use No        Substance and Sexual Activity   Drug Use Yes    Types: Marijuana     Social History     Tobacco Use   Smoking Status Current Every Day Smoker    Packs/day: 0 50    Years: 13 00    Pack years: 6 50    Types: Cigarettes   Smokeless Tobacco Never Used   Tobacco Comment    Refused     Family History   Problem Relation Age of Onset    No Known Problems Mother     Heart disease Father      LABORATORY DATA     Labs: I have personally reviewed pertinent reports      Results from last 7 days   Lab Units 12/04/19 2054   WBC Thousand/uL 7 76   HEMOGLOBIN g/dL 15 3   HEMATOCRIT % 44 8   PLATELETS Thousands/uL 277   NEUTROS PCT % 64   MONOS PCT % 10      Results from last 7 days   Lab Units 12/04/19 2053   POTASSIUM mmol/L 3 4*   CHLORIDE mmol/L 104 CO2 mmol/L 26   BUN mg/dL 6   CREATININE mg/dL 0 92   CALCIUM mg/dL 9 0   ALK PHOS U/L 57   ALT U/L 25   AST U/L 15     Results from last 7 days   Lab Units 12/04/19  2053   MAGNESIUM mg/dL 2 1              Results from last 7 days   Lab Units 12/04/19  2143   LACTIC ACID mmol/L 0 9     Results from last 7 days   Lab Units 12/05/19  0213   TROPONIN I ng/mL <0 02     Micro:  Lab Results   Component Value Date    BLOODCX Received in Microbiology Lab  Culture in Progress  12/05/2019    BLOODCX Received in Microbiology Lab  Culture in Progress  12/04/2019     IMAGING & DIAGNOSTIC TESTS     Imaging: I have personally reviewed pertinent reports  No results found  EKG, Pathology, and Other Studies: I have personally reviewed pertinent reports  ALLERGIES     Allergies   Allergen Reactions    Aloe Burn Relief [Lidocaine]      MEDICATIONS PRIOR TO ARRIVAL     Prior to Admission medications    Medication Sig Start Date End Date Taking?  Authorizing Provider   ibuprofen (MOTRIN) 800 mg tablet Take 800 mg by mouth every 8 (eight) hours as needed 1/17/19 12/5/19  Historical Provider, MD   ibuprofen (MOTRIN) 800 mg tablet Take 1 tablet (800 mg total) by mouth every 8 (eight) hours as needed for mild pain  Patient not taking: Reported on 12/4/2019 1/17/19 12/5/19  Pete Garcia PA-C   oxyCODONE (ROXICODONE) 5 mg immediate release tablet Take 5 mg by mouth every 4 (four) hours as needed 1/24/19 12/5/19  Historical Provider, MD     MEDICATIONS ADMINISTERED IN LAST 24 HOURS     Medication Administration - last 24 hours from 12/04/2019 1040 to 12/05/2019 1040       Date/Time Order Dose Route Action Action by     12/05/2019 0838 LORazepam (ATIVAN) 2 mg/mL injection 2 mg 2 mg Intravenous Given Tonya Lopez RN     60/27/2928 0837 LORazepam (ATIVAN) 2 mg/mL injection 2 mg 2 mg Intravenous Given Tonya Lopez RN        CURRENT MEDICATIONS       Current Facility-Administered Medications:  acetaminophen 650 mg Oral Q6H PRN Nohelia Yoo DO   levETIRAcetam 750 mg Intravenous Q12H [address] Miguel Neely PA-C   LORazepam 2 mg Intravenous Q5 Min PRN Nohelia Yoo DO   [START ON 12/6/2019] nicotine 1 patch Transdermal Daily Olaf Morgan MD          acetaminophen 650 mg Q6H PRN   LORazepam 2 mg Q5 Min PRN       Admission Time  I spent 20 minutes admitting the patient  This involved direct patient contact where I performed a full history and physical, reviewing previous records, and reviewing laboratory and other diagnostic studies  Portions of the record may have been created with voice recognition software  Occasional wrong word or "sound a like" substitutions may have occurred due to the inherent limitations of voice recognition software    Read the chart carefully and recognize, using context, where substitutions have occurred     ==  Nohelia Yoo, [facility]  Internal Medicine Residency PGY-2

## 2019-12-05 NOTE — ED NOTES
Pt having active seizure  Dr Yvette Magaña and Ashia Stark, from pharmacy called to room         Fanta Stanley RN  10/46/62 [id number]

## 2019-12-05 NOTE — ED NOTES
Pt has nicotine patch on already from [facility]       Sandra Márquez RN  14/74/47 [id number]

## 2019-12-05 NOTE — ASSESSMENT & PLAN NOTE
· New onset seizures while at [facility]  Patient was transferred to [facility] for further workup  Patient received 3 g IV Keppra while at [facility] prior to transfer  · Patient received 2 g IV Ativan while in the emergency department as he was noted to have tonic-clonic seizures and received another 2 g IV Ativan with cessation of seizures  · Talked with patient's father in regards to seizure history in the family and he denies any seizures in him or patient's mother  There was seizure history and maternal grandfather  No seizure history in brothers or sisters    · Chest x-ray-no acute cardiopulmonary disease  · UDS-positive for marijuana  · Meningitis panel negative  · CSF studies  · Gram stain no polyps or bacteria seen  · Gram stain and culture pending  · Total protein 42  · White cell count 0  · Glucose 60  · Blood culture x2 pending  · Ativan 2 mg Q 5 minutes for seizure activity lasting longer than 2 minutes  · Seizure precautions    - TSH decreased at 0 169, will check T4  - patient experienced multiple seizure-like events last night and overnight, which have been recorded on video EEG monitoring reviewed by on call epileptologist and has been deemed not epileptic in nature  - during these episodes, recorded vitals indicate occasional bouts of hypotension  - to rule out potential cardiac pathology, will place patient on telemetry  - Neurology following, recommendations greatly appreciated  - neurology recommends discontinuing scheduled Keppra, cancelling brain MRI, and consulting Psychiatry  - will advance diet  - continues video EEG monitoring discontinued  - will continue to monitor

## 2019-12-05 NOTE — ED NOTES
During seizure activity pt oxygen level decreased to 90%  NRB was placed  O2 sat increased to 98%        Elsa Harrison RN  12/05/19 Adama Avalos

## 2019-12-05 NOTE — CONSULTS
Consultation - Neurology   Matthew Welch 20 y o  male MRN: [id number]  Unit/Bed#: ED 13 Encounter: [id number]      Assessment/Plan   Assessment:  Matthew Welch is a 20 y o  male with reported 1 seizure during childhood who presented to [facility] with multiple seizures and was transferred to  [facility] for intractable seizure activity despite AED load  1  Intractable seizure activity, reported approximately 10 seizures in the past 24 hour  Concern for status epilepticus, will obtain routine EEG and plan to place on video EEG monitoring  Unclear of etiology of seizure activity at this time  Plan:  -Recommend video EEG monitoring  -Pending routine EEG read  -MRI brain w wo seizure protocol pending  -Pending:  CSF culture, blood cultures, CSF HSV type 1, 2 DNA PCR  -S/p IV Keppra load 3 g  -Started on Keppra 750 mg Q12H  -Seizure precautions  -Medical management per primary team  -Continue supportive care per primary team, notify with changes    Imaging/Labs:  -CTA head and neck unremarkable for intracranial mass, mass effect, or midline shift; no CT evidence of acute infarct or a parenchymal hemorrhage; no evidence of large intracranial aneurysm or stenosis  -S/p LP:  Fluid appearance clear, total volume 8 mm, tolerated well  -CSF results WNL:  RBC, glucose, WBC, total protein, Gram stain, meningitis/encephalitis panel  -UDS positive for THC urine  -Labs WNL:  Lipase, prolactin, CK, CK-MB, ethanol, strep aAPCR, influenza, lactic acid, UA, troponin    History of Present Illness     Reason for Consult / Principal Problem:  Seizures  Hx and PE limited by: Patient is sedated on ativan   HPI: Matthew Welch is a 20 y o   male with 1 reported seizure during childhood and no other significant past medical history who presents to [facility] with multiple seizures      Per chart review, patient was with his friends when patient's friends noticed he grabbed his chest and had seizure activity  Patients friend reported patients seizure episodes begin with left-sided chest pain, and patient reached up with his right arm to grab his chest  Following the chest pain, patient reports blurry vision, then double vision  Bystanders who witnessed the seizure activity reported violent shaking of bilateral upper and lower extremities, as well as gasping and incontinence  Following the episodes, patient was disoriented, however reportedly quickly returned to baseline  Patient's friend reported the episode lasted approximately 6 minutes and resolved on its own  Friend reported patient had 8 episodes prior to coming to the ED  Patient reports he had 1 seizure at the age of 14 when playing football  Per discussion with primary team who spoke to family, patient has only had 1 seizure in the past which he reported at the age of 14  Family denies history of CNS infection in the brain or spine, traumatic head injury, or recent illness  Family states patient has no significant past medical history and is not on any medications  Family states patient does occasionally drink and does smoke marijuana  Per discussion with RN in [facility] ED, patient had one seizure prior to transfer from [facility] (reported by EMS)  Upon arrival to [address] [place] [address] ED patient was not disoriented, however was slow to respond, able to name the month and state he was in the hospital  Shortly after arriving to [address] [place] [address], patient had another witnessed seizure which was witness my the RN  She reports the patient turned his head to the left and his left arm flexed, coming to his chest  He began to nod slowly and blink and the nodding began to increase in frequency  She then noted his eyes deviate upward and slightly to the left and then stiffened up  Patient then began having shaking in his bilateral upper and lower extremities  She states that shaking activity lasted approximately 1 minute    Patient was initially given 2 mg Ativan which did not resolve the seizure activity, and was then given an additional 2 mg of Ativan  Inpatient consult to Neurology  Consult performed by: Modesta Perez PA-C  Consult ordered by: Patsy Saldana DO        Review of Systems  12 point ROS limited to sedation    Historical Information   Past Medical History:   Diagnosis Date    Fracture, clavicle     at birth   Janis Slipper Knee dislocation     Knee dislocation, left, initial encounter     Seizures ([address] )      Past Surgical History:   Procedure Laterality Date    NO PAST SURGERIES       Social History   Social History     Substance and Sexual Activity   Alcohol Use No     Social History     Substance and Sexual Activity   Drug Use Yes    Types: Marijuana     Social History     Tobacco Use   Smoking Status Current Every Day Smoker    Packs/day: 0 50    Years: 13 00    Pack years: 6 50    Types: Cigarettes   Smokeless Tobacco Never Used   Tobacco Comment    Refused     Family History:   Family History   Problem Relation Age of Onset    No Known Problems Mother     Heart disease Father        Review of previous medical records was completed  Meds/Allergies   all current active meds have been reviewed, current meds:   Current Facility-Administered Medications   Medication Dose Route Frequency    LORazepam (ATIVAN) 2 mg/mL injection 2 mg  2 mg Intravenous Q5 Min PRN   , PTA meds:   None    and     Allergies   Allergen Reactions    Aloe Burn Relief [Lidocaine]        Objective   Vitals:Blood pressure 103/55, pulse 79, temperature 97 6 °F (36 4 °C), temperature source Oral, resp  rate 15, weight 77 1 kg (170 lb), SpO2 96 %  ,Body mass index is 23 06 kg/m²  No intake or output data in the 24 hours ending 12/05/19 0939    Invasive Devices: Invasive Devices     Peripheral Intravenous Line            Peripheral IV 12/04/19 Left Antecubital less than 1 day                Physical Exam   Constitutional: He appears well-developed and well-nourished  No distress  HENT:   Head: Normocephalic and atraumatic  Eyes: Right eye exhibits no discharge  Left eye exhibits no discharge  No scleral icterus  Erythematous palpebral conjunctiva bilaterally   Neck: Normal range of motion  Cardiovascular: Normal rate, regular rhythm and normal heart sounds  No murmur heard  Pulmonary/Chest: Effort normal and breath sounds normal  No respiratory distress  He has no wheezes  Abdominal: Soft  Bowel sounds are normal  He exhibits no distension  There is no tenderness  Skin: Skin is warm and dry  No rash noted  He is not diaphoretic  No erythema  Large print on the word "Williamlye Lunger" on patients back, appears to be written in marker    Nursing note and vitals reviewed  Neurologic Exam     Mental Status   Patient is significantly lethargic, not awakening to verbal, tactile, noxious stimuli  Patient does not follow any commands  Cranial Nerves     CN III, IV, VI   Conjugate gaze: present  Bilateral eyes fixed deviated upward  Bilateral pupils equal, round, approximately 3 mm bilat, sluggish reactivity  Absent blink to threat bilaterally  No apparent facial weakness noted      Motor Exam   Spontaneous movement noted in all extremities, appears symmetrical  No withdrawal to noxious stimuli in all 4 extremities  Localizes to sternal rub with left upper extremity     Sensory Exam   Sensory exam limited to lethargy     Gait, Coordination, and Reflexes   No involuntary/seizure-like activity noted on exam       Lab Results: I have personally reviewed pertinent reports    Recent Results (from the past 24 hour(s))   Comprehensive metabolic panel    Collection Time: 12/04/19  8:53 PM   Result Value Ref Range    Sodium 139 136 - 145 mmol/L    Potassium 3 4 (L) 3 5 - 5 3 mmol/L    Chloride 104 100 - 108 mmol/L    CO2 26 21 - 32 mmol/L    ANION GAP 9 4 - 13 mmol/L    BUN 6 5 - 25 mg/dL    Creatinine 0 92 0 60 - 1 30 mg/dL    Glucose 101 65 - 140 mg/dL    Calcium 9 0 8 3 - 10 1 mg/dL    AST 15 5 - 45 U/L    ALT 25 12 - 78 U/L    Alkaline Phosphatase 57 46 - 116 U/L    Total Protein 7 4 6 4 - 8 2 g/dL    Albumin 4 3 3 5 - 5 0 g/dL    Total Bilirubin 0 50 0 20 - 1 00 mg/dL    eGFR 119 ml/min/1 73sq m   Magnesium    Collection Time: 12/04/19  8:53 PM   Result Value Ref Range    Magnesium 2 1 1 6 - 2 6 mg/dL   Lipase    Collection Time: 12/04/19  8:53 PM   Result Value Ref Range    Lipase 82 73 - 393 u/L   CK (with reflex to MB)    Collection Time: 12/04/19  8:53 PM   Result Value Ref Range    Total  39 - 308 U/L   CKMB    Collection Time: 12/04/19  8:53 PM   Result Value Ref Range    CK-MB Index <1 0 0 0 - 2 5 %    CK-MB 0 7 0 0 - 5 0 ng/mL   CBC and differential    Collection Time: 12/04/19  8:54 PM   Result Value Ref Range    WBC 7 76 4 31 - 10 16 Thousand/uL    RBC 4 82 3 88 - 5 62 Million/uL    Hemoglobin 15 3 12 0 - 17 0 g/dL    Hematocrit 44 8 36 5 - 49 3 %    MCV 93 82 - 98 fL    MCH 31 7 26 8 - 34 3 pg    MCHC 34 2 31 4 - 37 4 g/dL    RDW 12 3 11 6 - 15 1 %    MPV 9 5 8 9 - 12 7 fL    Platelets 148 007 - 926 Thousands/uL    nRBC 0 /100 WBCs    Neutrophils Relative 64 43 - 75 %    Immat GRANS % 1 0 - 2 %    Lymphocytes Relative 23 14 - 44 %    Monocytes Relative 10 4 - 12 %    Eosinophils Relative 1 0 - 6 %    Basophils Relative 1 0 - 1 %    Neutrophils Absolute 4 94 1 85 - 7 62 Thousands/µL    Immature Grans Absolute 0 07 0 00 - 0 20 Thousand/uL    Lymphocytes Absolute 1 79 0 60 - 4 47 Thousands/µL    Monocytes Absolute 0 79 0 17 - 1 22 Thousand/µL    Eosinophils Absolute 0 09 0 00 - 0 61 Thousand/µL    Basophils Absolute 0 08 0 00 - 0 10 Thousands/µL   Ethanol    Collection Time: 12/04/19  9:39 PM   Result Value Ref Range    Ethanol Lvl <3 0 - 3 mg/dL   Salicylate level    Collection Time: 12/04/19  9:39 PM   Result Value Ref Range    Salicylate Lvl 4 6 3 - 20 mg/dL   Acetaminophen level-If concentration is detectable, please discuss with medical  on call      Collection Time: 12/04/19  9:39 PM   Result Value Ref Range Acetaminophen Level <2 0 (L) 10 - 20 ug/mL   Strep A PCR    Collection Time: 12/04/19  9:41 PM   Result Value Ref Range    STREP A PCR None Detected None Detected   Influenza A/B and RSV PCR    Collection Time: 12/04/19  9:41 PM   Result Value Ref Range    INFLUENZA A PCR None Detected None Detected    INFLUENZA B PCR None Detected None Detected    RSV PCR None Detected None Detected   Blood culture #2    Collection Time: 12/04/19  9:41 PM   Result Value Ref Range    Blood Culture Received in Microbiology Lab  Culture in Progress      Lactic acid, plasma    Collection Time: 12/04/19  9:43 PM   Result Value Ref Range    LACTIC ACID 0 9 0 5 - 2 0 mmol/L   Rapid drug screen, urine    Collection Time: 12/04/19 11:06 PM   Result Value Ref Range    Amph/Meth UR Negative Negative    Barbiturate Ur Negative Negative    Benzodiazepine Urine Negative Negative    Cocaine Urine Negative Negative    Methadone Urine Negative Negative    Opiate Urine Negative Negative    PCP Ur Negative Negative    THC Urine Positive (A) Negative   UA w Reflex to Microscopic w Reflex to Culture    Collection Time: 12/04/19 11:07 PM   Result Value Ref Range    Color, UA Yellow     Clarity, UA Clear     Specific Gravity, UA <=1 005 1 003 - 1 030    pH, UA 7 0 4 5, 5 0, 5 5, 6 0, 6 5, 7 0, 7 5, 8 0    Leukocytes, UA Trace (A) Negative    Nitrite, UA Negative Negative    Protein, UA Negative Negative mg/dl    Glucose, UA Negative Negative mg/dl    Ketones, UA Negative Negative mg/dl    Urobilinogen, UA 0 2 0 2, 1 0 E U /dl E U /dl    Bilirubin, UA Negative Negative    Blood, UA Negative Negative   Urine Microscopic    Collection Time: 12/04/19 11:07 PM   Result Value Ref Range    RBC, UA None Seen None Seen, 0-5 /hpf    WBC, UA 0-1 (A) None Seen, 0-5, 5-55, 5-65 /hpf    Epithelial Cells None Seen None Seen, Occasional /hpf    Bacteria, UA Occasional None Seen, Occasional /hpf   CSF, RBC count (Tube 1)    Collection Time: 12/05/19 12:03 AM   Result Value Ref Range    RBC, CSF 0 0 - 10 uL   CSF, Glucose (Tube 2)    Collection Time: 12/05/19 12:03 AM   Result Value Ref Range    Glucose, CSF 60 50 - 80 mg/dL   CSF, Total Protein (Tube 2)    Collection Time: 12/05/19 12:03 AM   Result Value Ref Range    Protein, CSF 42 15 - 45 mg/dL   CSF, Gram Stain (Tube 3)    Collection Time: 12/05/19 12:03 AM   Result Value Ref Range    Gram Stain Result No Polys or Bacteria seen    CSF, White cell count with differential (Tube 4)    Collection Time: 12/05/19 12:03 AM   Result Value Ref Range    Appearance, CSF Clear, Colorless     Tube Number, CSF 4     WBC, CSF 0 0 - 5 /uL    Xanthochromia No No   CSF, RBC count (Tube 4)    Collection Time: 12/05/19 12:03 AM   Result Value Ref Range    RBC, CSF 0 0 - 10 uL   Meningitis/Encephalitis (ME) Panel    Collection Time: 12/05/19 12:04 AM   Result Value Ref Range    C NEOFORMANS/GATTII Not Detected Not Detected    CYTOMEGALOVIRUS Not Detected Not Detected    ENTEROVIRUS Not Detected Not Detected    E COLI K1 Not Detected Not Detected    H INFLUENZAE Not Detected Not Detected    H SIMPLEX 1 Not Detected Not Detected    H SIMPLEX 2 Not Detected Not Detected    HERPES VIRUS 6 Not Detected Not Detected    PARECHOVIRUS Not Detected Not Detected    L  MONOCYTOGENES Not Detected Not Detected    N MENINGITIDIS Not Detected Not Detected    S AGALACTIAE Not Detected Not Detected    S  PNEUMONIAE Not Detected Not Detected    V ZOSTER Not Detected Not Detected   Blood culture #1    Collection Time: 12/05/19  2:11 AM   Result Value Ref Range    Blood Culture Received in Microbiology Lab  Culture in Progress      Troponin I    Collection Time: 12/05/19  2:13 AM   Result Value Ref Range    Troponin I <0 02 <=0 04 ng/mL   Fingerstick Glucose (POCT)    Collection Time: 12/05/19  8:55 AM   Result Value Ref Range    POC Glucose 90 65 - 140 mg/dl   ]  Imaging Studies: I have personally reviewed pertinent reports and I have personally reviewed pertinent films in PACS  EKG, Pathology, and Other Studies: I have personally reviewed pertinent reports  Counseling / Coordination of Care  Total time spent today 50 minutes  Greater than 50% of total time was spent with the patient and/or family counseling and/or coordination of care  A description of the counseling/coordination of care:  Patient was seen and evaluated  Discussed with attending  Chart reviewed thoroughly including laboratory and imaging studies    Plan of care discussed with patient and primary team

## 2019-12-05 NOTE — ED PROVIDER NOTES
History  Chief Complaint   Patient presents with    Seizure Re-Evaluation     Friends witnessed the patient grab his chest and what appears to be a "seizure " Upon ambulance arrival patient was post-ictal shaking  He has not had seizures since he was 14years old  Patient described it as "seeing colors and then everything going black "     Patient: Lavinia Mcdonald  19 y o /male  YOB: 2000  MRN: [id number]  PCP: Blaine Ríos MD  Date of evaluation: 12/4/2019    (N B   [address] may have been used in the preparation of this document  Occasional wrong word or "sound-alike" substitutions may have occurred due to the inherent limitations of voice recognition software  Interpretation should be guided by context )    Chief complaint:  Multiple seizures today  History is given by the patient and by friends at bedside  He was out with other friends today who did tell the patient or family anything very specific other than that he had 8 these episodes  today  The patient reports that these episodes today all began with left-sided chest pain, and each time he reached up with his right arm to the area of the pain  Then all the colors in his visual field started to blur together  His vision became double vision  Next, according to bystanders, he becomes unconscious and he has violent shaking of his arms and legs  His respirations become "gasping"  as though can't breathe  On 1 occasion his friend did hold his mouth open because she thought could not breathe  The patient wakes up slightly disoriented after these episodes but quickly returns to his baseline  The episode he had PTA was witnessed by a friend who is currently at the bedside  The episode was 6 minutes long and apparently self resolved  With none of these episodes was he incontinent  He sustained no intraoral injuries or other injuries        Seizure Re-Evaluation   Context: fever    Context: not alcohol withdrawal, not change in medication, not sleeping less, not drug use, not flashing visual stimuli, medical compliance, not possible hypoglycemia and not possible medication ingestion  Head injury: Remote, as a child, not of this same character  Recent head injury:  No recent head injuries      Prior to Admission Medications   Prescriptions Last Dose Informant Patient Reported? Taking?   ibuprofen (MOTRIN) 800 mg tablet   Yes No   Sig: Take 800 mg by mouth every 8 (eight) hours as needed   ibuprofen (MOTRIN) 800 mg tablet Not Taking at Unknown time  No No   Sig: Take 1 tablet (800 mg total) by mouth every 8 (eight) hours as needed for mild pain   Patient not taking: Reported on 12/4/2019   oxyCODONE (ROXICODONE) 5 mg immediate release tablet Not Taking at Unknown time  Yes No   Sig: Take 5 mg by mouth every 4 (four) hours as needed      Facility-Administered Medications: None       Past Medical History:   Diagnosis Date    Fracture, clavicle     at birth    Knee dislocation     Knee dislocation, left, initial encounter     Seizures ([facility] [address] )        Past Surgical History:   Procedure Laterality Date    NO PAST SURGERIES         Family History   Problem Relation Age of Onset    No Known Problems Mother     Heart disease Father      I have reviewed and agree with the history as documented  Social History     Tobacco Use    Smoking status: Current Every Day Smoker     Packs/day: 0 50     Years: 13 00     Pack years: 6 50     Types: Cigarettes    Smokeless tobacco: Never Used    Tobacco comment: Refused   Substance Use Topics    Alcohol use: No    Drug use: Yes     Types: Marijuana        Review of Systems   Constitutional: Positive for chills and fever  HENT: Negative  Negative for trouble swallowing and voice change  Eyes: Negative for photophobia and visual disturbance  Respiratory: Negative  Negative for cough and shortness of breath  Cardiovascular: Negative    Negative for chest pain and palpitations  Gastrointestinal: Negative  Negative for abdominal pain and vomiting  Endocrine: Negative  Negative for polydipsia and polyuria  Genitourinary: Negative  Negative for difficulty urinating and urgency  Musculoskeletal: Positive for back pain and neck pain         "whole body stiffness"   Skin: Negative  Negative for rash and wound  Allergic/Immunologic: Negative for immunocompromised state  Neurological: Positive for seizures  Negative for speech difficulty and weakness  Hematological: Negative  Negative for adenopathy  Does not bruise/bleed easily  Psychiatric/Behavioral: Negative for confusion  The patient is not nervous/anxious  All other systems reviewed and are negative  Physical Exam  Physical Exam   Constitutional: He is oriented to person, place, and time  He appears well-developed and well-nourished  No distress  HENT:   Head: Normocephalic and atraumatic  Mouth/Throat: Oropharynx is clear and moist and mucous membranes are normal    Voice normal   Eyes: Pupils are equal, round, and reactive to light  EOM are normal    Neck: No neck rigidity  No Brudzinski's sign and no Kernig's sign noted  Cardiovascular: Normal rate and regular rhythm  Pulmonary/Chest: Effort normal    Abdominal: Soft  Bowel sounds are normal    Neurological: He is alert and oriented to person, place, and time  He has normal strength  No cranial nerve deficit  GCS eye subscore is 4  GCS verbal subscore is 5  GCS motor subscore is 6  Skin: Skin is warm and dry  Psychiatric: He has a normal mood and affect  His speech is normal and behavior is normal    Nursing note and vitals reviewed        Vital Signs  ED Triage Vitals [12/04/19 2044]   Temperature Pulse Respirations Blood Pressure SpO2   98 6 °F (37 °C) 81 22 127/81 97 %      Temp Source Heart Rate Source Patient Position - Orthostatic VS BP Location FiO2 (%)   Temporal Monitor Lying Right arm --      Pain Score       8 Vitals:    12/05/19 0145 12/05/19 0200 12/05/19 0215 12/05/19 0230   BP:   112/66 120/63   Pulse: 62 74 64 77   Patient Position - Orthostatic VS:    Sitting         Visual Acuity  Visual Acuity      Most Recent Value   L Pupil Size (mm)  3   R Pupil Size (mm)  3          ED Medications  Medications   nicotine (NICODERM CQ) 21 mg/24 hr TD 24 hr patch 21 mg (21 mg Transdermal Medication Applied 12/5/19 0310)   morphine (PF) 10 mg/mL injection 6 mg (6 mg Intravenous Given 12/4/19 2245)   sodium chloride 0 9 % bolus 1,000 mL (0 mL Intravenous Stopped 12/4/19 2350)   iohexol (OMNIPAQUE) 350 MG/ML injection (MULTI-DOSE) 100 mL (100 mL Intravenous Given 12/4/19 2205)   vancomycin (VANCOCIN) 2,000 mg in sodium chloride 0 9 % 500 mL IVPB (2,000 mg Intravenous New Bag 12/5/19 0134)   cefTRIAXone (ROCEPHIN) IVPB (premix) 2,000 mg (0 mg Intravenous Stopped 12/4/19 2332)   diphenhydrAMINE (BENADRYL) injection 50 mg (50 mg Intravenous Given 12/5/19 0001)   sterile water injection **ADS Override Pull** (10 mL  Given 12/5/19 0001)   sodium chloride 0 9 % bolus 1,000 mL (0 mL Intravenous Stopped 12/5/19 0232)   acetaminophen (TYLENOL) tablet 650 mg (650 mg Oral Given 12/5/19 0207)   morphine (PF) 10 mg/mL injection 6 mg (6 mg Intravenous Given 12/5/19 0207)   levETIRAcetam (KEPPRA) 3,000 mg in sodium chloride 0 9 % 250 mL IVPB (3,000 mg Intravenous New Bag 12/5/19 0326)       Diagnostic Studies  Results Reviewed     Procedure Component Value Units Date/Time    Troponin I [[id number]]  (Normal) Collected:  12/05/19 0213    Lab Status:  Final result Specimen:  Blood from Arm, Right Updated:  12/05/19 0242     Troponin I <0 02 ng/mL     Blood culture #1 [[id number]] Collected:  12/05/19 0211    Lab Status:   In process Specimen:  Blood from Arm, Left Updated:  12/05/19 0218    CSF, Gram Stain (Tube 3) [[id number]] Collected:  12/05/19 0003    Lab Status:  Final result Specimen:  Cerebrospinal Fluid from Lumbar Puncture Updated: 12/05/19 0159     Gram Stain Result No Polys or Bacteria seen    CSF, RBC count (Tube 4) [[id number]]  (Normal) Collected:  12/05/19 0003    Lab Status:  Final result Specimen:  Cerebrospinal Fluid from Lumbar Puncture Updated:  12/05/19 0100     RBC, CSF 0 uL     CSF, RBC count (Tube 1) [[id number]]  (Normal) Collected:  12/05/19 0003    Lab Status:  Final result Specimen:  Cerebrospinal Fluid from Lumbar Puncture Updated:  12/05/19 0059     RBC, CSF 0 uL     CSF, White cell count with differential (Tube 4) [[id number]] Collected:  12/05/19 0003    Lab Status:  Final result Specimen:  Cerebrospinal Fluid from Lumbar Puncture Updated:  12/05/19 0059     Appearance, CSF Clear, Colorless     Tube Number, CSF 4     WBC, CSF 0 /uL      Xanthochromia No    CSF, Glucose (Tube 2) [[id number]]  (Normal) Collected:  12/05/19 0003    Lab Status:  Final result Specimen:  Cerebrospinal Fluid from Lumbar Puncture Updated:  12/05/19 0055     Glucose, CSF 60 mg/dL     CSF, Total Protein (Tube 2) [[id number]]  (Normal) Collected:  12/05/19 0003    Lab Status:  Final result Specimen:  Cerebrospinal Fluid from Lumbar Puncture Updated:  12/05/19 0055     Protein, CSF 42 mg/dL     Meningitis/Encephalitis (ME) Panel [[id number]] Collected:  12/05/19 0004    Lab Status: In process Specimen:  Cerebrospinal Fluid from Lumbar Puncture Updated:  12/05/19 0009    CSF, Culture and Gram stain (Tube 3) [[id number]] Collected:  12/05/19 0003    Lab Status:   In process Specimen:  Cerebrospinal Fluid from Lumbar Puncture Updated:  12/05/19 0009    CKMB [[id number]]  (Normal) Collected:  12/04/19 2053    Lab Status:  Final result Specimen:  Blood from Arm, Left Updated:  12/04/19 2350     CK-MB Index <1 0 %      CK-MB 0 7 ng/mL     CK (with reflex to MB) [[id number]]  (Normal) Collected:  12/04/19 2053    Lab Status:  Final result Specimen:  Blood from Arm, Left Updated:  12/04/19 2341     Total  U/L     Urine Microscopic [[id number]]  (Abnormal) Collected:  12/04/19 2307    Lab Status:  Final result Specimen:  Urine, Clean Catch Updated:  12/04/19 2334     RBC, UA None Seen /hpf      WBC, UA 0-1 /hpf      Epithelial Cells None Seen /hpf      Bacteria, UA Occasional /hpf     Rapid drug screen, urine [[id number]]  (Abnormal) Collected:  12/04/19 2306    Lab Status:  Final result Specimen:  Urine, Clean Catch Updated:  12/04/19 2328     Amph/Meth UR Negative     Barbiturate Ur Negative     Benzodiazepine Urine Negative     Cocaine Urine Negative     Methadone Urine Negative     Opiate Urine Negative     PCP Ur Negative     THC Urine Positive    Narrative:       Presumptive report  If requested, specimen will be sent to reference lab for confirmation  FOR MEDICAL PURPOSES ONLY  IF CONFIRMATION NEEDED PLEASE CONTACT THE LAB WITHIN 5 DAYS  Drug Screen Cutoff Levels:  AMPHETAMINE/METHAMPHETAMINES  1000 ng/mL  BARBITURATES     200 ng/mL  BENZODIAZEPINES     200 ng/mL  COCAINE      300 ng/mL  METHADONE      300 ng/mL  OPIATES      300 ng/mL  PHENCYCLIDINE     25 ng/mL  THC       50 ng/mL      UA w Reflex to Microscopic w Reflex to Culture [[id number]]  (Abnormal) Collected:  12/04/19 2307    Lab Status:  Final result Specimen:  Urine, Clean Catch Updated:  12/04/19 2319     Color, UA Yellow     Clarity, UA Clear     Specific Gravity, UA <=1 005     pH, UA 7 0     Leukocytes, UA Trace     Nitrite, UA Negative     Protein, UA Negative mg/dl      Glucose, UA Negative mg/dl      Ketones, UA Negative mg/dl      Urobilinogen, UA 0 2 E U /dl      Bilirubin, UA Negative     Blood, UA Negative    Prolactin [[id number]] Collected:  12/04/19 2053    Lab Status:   In process Specimen:  Blood Updated:  12/04/19 2315    Influenza A/B and RSV PCR [[id number]]  (Normal) Collected:  12/04/19 2141    Lab Status:  Final result Specimen:  Nasopharyngeal Swab Updated:  12/04/19 2238     INFLUENZA A PCR None Detected     INFLUENZA B PCR None Detected     RSV PCR None Detected    Strep A PCR [[id number]]  (Normal) Collected:  12/04/19 2141    Lab Status:  Final result Specimen:  Throat Updated:  12/04/19 2229     STREP A PCR None Detected    Lactic acid, plasma [[id number]]  (Normal) Collected:  12/04/19 2143    Lab Status:  Final result Specimen:  Blood from Arm, Left Updated:  12/04/19 2214     LACTIC ACID 0 9 mmol/L     Narrative:       Result may be elevated if tourniquet was used during collection  Blood culture #2 [[id number]] Collected:  12/04/19 2141    Lab Status: In process Specimen:  Blood from Arm, Left Updated:  12/04/19 2153    Ethanol [[id number]]  (Normal) Collected:  12/04/19 2139    Lab Status:  Final result Specimen:  Blood Updated:  12/04/19 2150     Ethanol Lvl <3 mg/dL     Salicylate level [[id number]]  (Normal) Collected:  12/04/19 2139    Lab Status:  Final result Specimen:  Blood Updated:  34/76/69 2074     Salicylate Lvl 4 6 mg/dL     Acetaminophen level-If concentration is detectable, please discuss with medical  on call   [[id number]]  (Abnormal) Collected:  12/04/19 2139    Lab Status:  Final result Specimen:  Blood Updated:  12/04/19 2150     Acetaminophen Level <2 0 ug/mL     Comprehensive metabolic panel [[id number]]  (Abnormal) Collected:  12/04/19 2053    Lab Status:  Final result Specimen:  Blood from Arm, Left Updated:  12/04/19 2126     Sodium 139 mmol/L      Potassium 3 4 mmol/L      Chloride 104 mmol/L      CO2 26 mmol/L      ANION GAP 9 mmol/L      BUN 6 mg/dL      Creatinine 0 92 mg/dL      Glucose 101 mg/dL      Calcium 9 0 mg/dL      AST 15 U/L      ALT 25 U/L      Alkaline Phosphatase 57 U/L      Total Protein 7 4 g/dL      Albumin 4 3 g/dL      Total Bilirubin 0 50 mg/dL      eGFR 119 ml/min/1 73sq m     Narrative:       [place] guidelines for Chronic Kidney Disease (CKD):     Stage 1 with normal or high GFR (GFR > 90 mL/min/1 73 square meters)    Stage 2 Mild CKD (GFR = 60-89 mL/min/1 73 square meters)    Stage 3A Moderate CKD (GFR = 45-59 mL/min/1 73 square meters)    Stage 3B Moderate CKD (GFR = 30-44 mL/min/1 73 square meters)    Stage 4 Severe CKD (GFR = 15-29 mL/min/1 73 square meters)    Stage 5 End Stage CKD (GFR <15 mL/min/1 73 square meters)  Note: GFR calculation is accurate only with a steady state creatinine    Magnesium [[id number]]  (Normal) Collected:  12/04/19 2053    Lab Status:  Final result Specimen:  Blood from Arm, Left Updated:  12/04/19 2121     Magnesium 2 1 mg/dL     Lipase [[id number]]  (Normal) Collected:  12/04/19 2053    Lab Status:  Final result Specimen:  Blood from Arm, Left Updated:  12/04/19 2121     Lipase 82 u/L     CBC and differential [[id number]] Collected:  12/04/19 2054    Lab Status:  Final result Specimen:  Blood from Arm, Left Updated:  12/04/19 2100     WBC 7 76 Thousand/uL      RBC 4 82 Million/uL      Hemoglobin 15 3 g/dL      Hematocrit 44 8 %      MCV 93 fL      MCH 31 7 pg      MCHC 34 2 g/dL      RDW 12 3 %      MPV 9 5 fL      Platelets 179 Thousands/uL      nRBC 0 /100 WBCs      Neutrophils Relative 64 %      Immat GRANS % 1 %      Lymphocytes Relative 23 %      Monocytes Relative 10 %      Eosinophils Relative 1 %      Basophils Relative 1 %      Neutrophils Absolute 4 94 Thousands/µL      Immature Grans Absolute 0 07 Thousand/uL      Lymphocytes Absolute 1 79 Thousands/µL      Monocytes Absolute 0 79 Thousand/µL      Eosinophils Absolute 0 09 Thousand/µL      Basophils Absolute 0 08 Thousands/µL                  CTA head and neck with and without contrast   Final Result by Dior Briseno DO (12/04 2235)      No evidence of large intracranial aneurysm or stenosis                  Workstation performed: [id number]         XR chest 2 views    (Results Pending)              Procedures  Lumbar puncture  Date/Time: 12/4/2019 11:30 PM  Performed by: Alesha Russell MD  Authorized by: Alesha Russell MD     Patient location:  ED  Consent:     Consent obtained:  Written    Consent given by:  Patient    Risks discussed:  Bleeding, headache, infection, nerve damage and pain    Alternatives discussed:  Alternative treatment  Universal protocol:     Procedure explained and questions answered to patient or proxy's satisfaction: yes      Immediately prior to procedure a time out was called: yes      Patient identity confirmed:  Verbally with patient  Pre-procedure details:     Preparation: Patient was prepped and draped in usual sterile fashion    Indications:     Indications: evaluation for infection    Anesthesia (see MAR for exact dosages): Anesthesia method:  Local infiltration    Local anesthetic:  Diphenhydramine 1%  Procedure details:     Lumbar space:  L3-L4 interspace    Patient position:  Sitting    Equipment: Lumbar puncture kit used      Needle gauge:  20G x 3 5in    Ultrasound guidance: no      Number of attempts:  1    Fluid appearance:  Clear    Tubes of fluid:  4    Total volume (ml):  8  Post-procedure:     Puncture site:  Adhesive bandage applied and direct pressure applied    Patient tolerance of procedure: Tolerated well, no immediate complications    Complication comments (i e , not enough fluid obtained, etc ):  None    Patient instructed to lie flat for one(1) hour post lumbar puncture : Yes               ED Course  ED Course as of Dec 05 0343   Wed Dec 04, 2019   2200 I asked Reading Room to prioritize if possible  2310 Zone 2 being closed down due to shift change      Thu Dec 05, 2019   0014 Paged       [address]  Paging Neurology via       3804 CSF, Total Protein (Tube 2)   0142 PROTEIN CSF: 42   0142 GLUCOSE CSF: 60   0143 WBC CSF: 0   0143 XANTHOCHROMIA: No   0143 RBC CSF: 0   0144 pending   CSF, Gram Stain (Tube 3)   0144 VSS, mental status normal, c/o chest pain and headache  Meds ordered        0207 GRAM STAIN RESULT: No Polys or Bacteria seen   0250 Texted with Dr Jayson Davey, Eileptology    who recommends transfer to [facility], SLIM srevici, sami VEEG, can load with Keppra 3 gm  5922 Pt advised of these recommendations and agrees to the plan                                  MDM  Number of Diagnoses or Management Options  Fever:   Headache:   Seizure-like activity [facility]): new and requires workup     Amount and/or Complexity of Data Reviewed  Tests in the radiology section of CPT®: ordered and reviewed  Tests in the medicine section of CPT®: ordered and reviewed  Obtain history from someone other than the patient: yes  Discuss the patient with other providers: yes  Independent visualization of images, tracings, or specimens: yes    Risk of Complications, Morbidity, and/or Mortality  Presenting problems: high  Diagnostic procedures: high  Management options: high    Patient Progress  Patient progress: improved        Disposition  Final diagnoses:   Seizure-like activity ([facility] [address] )   Fever   Headache     Time reflects when diagnosis was documented in both MDM as applicable and the Disposition within this note     Time User Action Codes Description Comment    12/5/2019  3:35 AM Andrew Haynes Add [R56 9] Seizure-like activity ([facility] [address] )     12/5/2019  3:43 AM Andrew Haynes Add [R50 9] Fever     12/5/2019  3:43 AM Ruben Hummel 15 Headache       ED Disposition     ED Disposition Condition Date/Time Comment    Transfer to Another Facility-In Network  Thu Dec 5, 2019  3:35 AM Aime Rebolledo should be transferred out to B          MD Documentation      Most Recent Value   Patient Condition  The patient has been stabilized such that within reasonable medical probability, no material deterioration of the patient condition or the condition of the unborn child(china) is likely to result from the transfer   Reason for Transfer  Level of Care needed not available at this facility   Benefits of Transfer  -- [Neurology]   Risks of Transfer  Potential for delay in receiving treatment, Potential deterioration of medical condition, Loss of IV, Increased discomfort during transfer Accepting Physician  Dr Marthe Leventhal for Dr Brittany Saucedo Name, [address]    (Name & Tel number)  [facility]   Sending MD Reanna Franklin   Provider Certification  General risk, such as traffic hazards, adverse weather conditions, rough terrain or turbulence, possible failure of equipment (including vehicle or aircraft), or consequences of actions of persons outside the control of the transport personnel, Unanticipated needs of medical equipment and personnel during transport, Risk of worsening condition      RN Documentation      Most [address] [place] [address] Name, [address]    (Name & Tel number)  [facility]      Follow-up Information    None         Patient's Medications   Discharge Prescriptions    No medications on file     No discharge procedures on file      ED Provider  Electronically Signed by           Elray Nyhan, MD  12/05/19 9744       Elray Nyhan, MD  12/05/19 9484

## 2019-12-05 NOTE — EMTALA/ACUTE CARE TRANSFER
[facility] EMERGENCY DEPARTMENT  [address] [place] [id number]  Dept: [address] [place] TRANSFER CONSENT    NAME Zhane Guerrero                                         2000                              MRN [id number]    I have been informed of my rights regarding examination, treatment, and transfer   by Dr Beatriz Garcias MD    Benefits: (Neurology)    Risks: Potential for delay in receiving treatment, Potential deterioration of medical condition, Loss of IV, Increased discomfort during transfer      Consent for Transfer:  I acknowledge that my medical condition has been evaluated and explained to me by the emergency department physician or other qualified medical person and/or my attending physician, who has recommended that I be transferred to the service of  Accepting Physician: Dr Natasha Erazo for Dr Luis Cavanaugh at [address] Name, [address] : [address] [place]  The above potential benefits of such transfer, the potential risks associated with such transfer, and the probable risks of not being transferred have been explained to me, and I fully understand them  The doctor has explained that, in my case, the benefits of transfer outweigh the risks  I agree to be transferred  I authorize the performance of emergency medical procedures and treatments upon me in both transit and upon arrival at the receiving facility  Additionally, I authorize the release of any and all medical records to the receiving facility and request they be transported with me, if possible  I understand that the safest mode of transportation during a medical emergency is an ambulance and that the Hospital advocates the use of this mode of transport  Risks of traveling to the receiving facility by car, including absence of medical control, life sustaining equipment, such as oxygen, and medical personnel has been explained to me and I fully understand them      ([place] BELOW)  [  ]  I consent to the stated transfer and to be transported by ambulance/helicopter  [  ]  I consent to the stated transfer, but refuse transportation by ambulance and accept full responsibility for my transportation by car  I understand the risks of non-ambulance transfers and I exonerate the Hospital and its staff from any deterioration in my condition that results from this refusal     X___________________________________________    DATE  19  TIME________  Signature of patient or legally responsible individual signing on patient behalf           RELATIONSHIP TO PATIENT_________________________          Provider Certification    NAME Dave Garrett                                         2000                              MRN [id number]    A medical screening exam was performed on the above named patient  Based on the examination:    Condition Necessitating Transfer The encounter diagnosis was Seizure-like activity ([address] )  Patient Condition: The patient has been stabilized such that within reasonable medical probability, no material deterioration of the patient condition or the condition of the unborn child(china) is likely to result from the transfer    Reason for Transfer: Level of Care needed not available at this facility    Transfer Requirements: [address]   · Space available and qualified personnel available for treatment as acknowledged by [facility]  · Agreed to accept transfer and to provide appropriate medical treatment as acknowledged by       Dr Patrice Todd for Dr Charlee Uriostegui  · Appropriate medical records of the examination and treatment of the patient are provided at the time of transfer   [address], [address] _______  · Transfer will be performed by qualified personnel from    and appropriate transfer equipment as required, including the use of necessary and appropriate life support measures      Provider Certification: I have examined the patient and explained the following risks and benefits of being transferred/refusing transfer to the patient/family:  General risk, such as traffic hazards, adverse weather conditions, rough terrain or turbulence, possible failure of equipment (including vehicle or aircraft), or consequences of actions of persons outside the control of the transport personnel, Unanticipated needs of medical equipment and personnel during transport, Risk of worsening condition      Based on these reasonable risks and benefits to the patient and/or the unborn child(china), and based upon the information available at the time of the patients examination, I certify that the medical benefits reasonably to be expected from the provision of appropriate medical treatments at another medical facility outweigh the increasing risks, if any, to the individuals medical condition, and in the case of labor to the unborn child, from effecting the transfer      X____________________________________________ DATE 12/05/19        TIME_______      ORIGINAL - SEND TO MEDICAL RECORDS   COPY - SEND WITH PATIENT DURING TRANSFER

## 2019-12-05 NOTE — ED PROVIDER NOTES
Called to bedside for seizure activity: tonic clonic  Seizure spontaneously resolved after one minute but then began again  Lorazepam 2mg given  Seizure activity subsided  Pt then responsive, somnolent and interactive  Airway protected  Admitting team notified        [address] , DO  12/05/19 7993

## 2019-12-05 NOTE — ED NOTES
Spoke with Dr Masoud Nascimento with SOD and he is fully aware of pt's GCS        Miguel Ng RN  59/86/58 [id number]

## 2019-12-06 PROBLEM — R07.81 PLEURITIC CHEST PAIN: Status: ACTIVE | Noted: 2019-12-06

## 2019-12-06 LAB
ANION GAP SERPL CALCULATED.3IONS-SCNC: 3 MMOL/L (ref 4–13)
ATRIAL RATE: 75 BPM
BUN SERPL-MCNC: 6 MG/DL (ref 5–25)
CALCIUM SERPL-MCNC: 9 MG/DL (ref 8.3–10.1)
CHLORIDE SERPL-SCNC: 108 MMOL/L (ref 100–108)
CO2 SERPL-SCNC: 27 MMOL/L (ref 21–32)
CREAT SERPL-MCNC: 0.84 MG/DL (ref 0.6–1.3)
GFR SERPL CREATININE-BSD FRML MDRD: 126 ML/MIN/1.73SQ M
GLUCOSE SERPL-MCNC: 81 MG/DL (ref 65–140)
P AXIS: -20 DEGREES
POTASSIUM SERPL-SCNC: 3.4 MMOL/L (ref 3.5–5.3)
PR INTERVAL: 138 MS
QRS AXIS: 72 DEGREES
QRSD INTERVAL: 106 MS
QT INTERVAL: 364 MS
QTC INTERVAL: 406 MS
SODIUM SERPL-SCNC: 138 MMOL/L (ref 136–145)
T WAVE AXIS: 47 DEGREES
T4 FREE SERPL-MCNC: 1.14 NG/DL (ref 0.78–1.33)
TSH SERPL DL<=0.05 MIU/L-ACNC: 0.17 UIU/ML (ref 0.46–3.98)
VENTRICULAR RATE: 75 BPM

## 2019-12-06 PROCEDURE — 84439 ASSAY OF FREE THYROXINE: CPT | Performed by: STUDENT IN AN ORGANIZED HEALTH CARE EDUCATION/TRAINING PROGRAM

## 2019-12-06 PROCEDURE — 99233 SBSQ HOSP IP/OBS HIGH 50: CPT | Performed by: INTERNAL MEDICINE

## 2019-12-06 PROCEDURE — 95816 EEG AWAKE AND DROWSY: CPT | Performed by: PSYCHIATRY & NEUROLOGY

## 2019-12-06 PROCEDURE — 93005 ELECTROCARDIOGRAM TRACING: CPT

## 2019-12-06 PROCEDURE — 99222 1ST HOSP IP/OBS MODERATE 55: CPT | Performed by: PSYCHIATRY & NEUROLOGY

## 2019-12-06 PROCEDURE — 84443 ASSAY THYROID STIM HORMONE: CPT | Performed by: INTERNAL MEDICINE

## 2019-12-06 PROCEDURE — 99233 SBSQ HOSP IP/OBS HIGH 50: CPT | Performed by: PSYCHIATRY & NEUROLOGY

## 2019-12-06 PROCEDURE — 80048 BASIC METABOLIC PNL TOTAL CA: CPT | Performed by: INTERNAL MEDICINE

## 2019-12-06 PROCEDURE — 93010 ELECTROCARDIOGRAM REPORT: CPT | Performed by: INTERNAL MEDICINE

## 2019-12-06 RX ORDER — DIVALPROEX SODIUM 500 MG/1
500 TABLET, DELAYED RELEASE ORAL
Status: DISCONTINUED | OUTPATIENT
Start: 2019-12-06 | End: 2019-12-07 | Stop reason: HOSPADM

## 2019-12-06 RX ORDER — POTASSIUM CHLORIDE 20 MEQ/1
20 TABLET, EXTENDED RELEASE ORAL ONCE
Status: COMPLETED | OUTPATIENT
Start: 2019-12-06 | End: 2019-12-06

## 2019-12-06 RX ADMIN — NICOTINE 1 PATCH: 21 PATCH, EXTENDED RELEASE TRANSDERMAL at 08:00

## 2019-12-06 RX ADMIN — ACETAMINOPHEN 650 MG: 325 TABLET ORAL at 23:47

## 2019-12-06 RX ADMIN — POTASSIUM CHLORIDE 20 MEQ: 1500 TABLET, EXTENDED RELEASE ORAL at 12:51

## 2019-12-06 RX ADMIN — ACETAMINOPHEN 650 MG: 325 TABLET ORAL at 03:52

## 2019-12-06 RX ADMIN — ACETAMINOPHEN 650 MG: 325 TABLET ORAL at 17:45

## 2019-12-06 RX ADMIN — DIVALPROEX SODIUM 500 MG: 500 TABLET, DELAYED RELEASE ORAL at 22:25

## 2019-12-06 NOTE — PROGRESS NOTES
INTERNAL MEDICINE RESIDENCY PROGRESS NOTE     Name: Pacheco Ribera   Age & Sex: 20 y o  male   MRN: [id number]  Unit/Bed#: [place] 719-01   Encounter: [id number]  Team: SOD Team C     PATIENT INFORMATION     Name: Pacheco Ribera   Age & Sex: 20 y o  male   MRN: [id number]  Hospital Stay Days: 1    ASSESSMENT/PLAN     Principal Problem:    New onset seizure [facility])  Active Problems:    Pleuritic chest pain      Pleuritic chest pain  Assessment & Plan  - during exam this morning, patient complains of intermittent it is severe migratory pain in his chest that is worse with breathing  - findings concerning for costochondritis  - continue Tylenol p r n  * New onset seizure [facility])  Assessment & Plan  · New onset seizures while at [facility]  Patient was transferred to [facility] for further workup  Patient received 3 g IV Keppra while at [facility] prior to transfer  · Patient received 2 g IV Ativan while in the emergency department as he was noted to have tonic-clonic seizures and received another 2 g IV Ativan with cessation of seizures  · Talked with patient's father in regards to seizure history in the family and he denies any seizures in him or patient's mother  There was seizure history and maternal grandfather  No seizure history in brothers or sisters    · Chest x-ray-no acute cardiopulmonary disease  · UDS-positive for marijuana  · Meningitis panel negative  · CSF studies  · Gram stain no polyps or bacteria seen  · Gram stain and culture pending  · Total protein 42  · White cell count 0  · Glucose 60  · Blood culture x2 pending  · Ativan 2 mg Q 5 minutes for seizure activity lasting longer than 2 minutes  · Seizure precautions    - TSH decreased at 0 169, will check T4  - patient experienced multiple seizure-like events last night and overnight, which have been recorded on video EEG monitoring reviewed by on call epileptologist and has been deemed not epileptic in nature  - during these episodes, recorded vitals indicate occasional bouts of hypotension  - to rule out potential cardiac pathology, will place patient on telemetry  - Neurology following, recommendations greatly appreciated  - neurology recommends discontinuing scheduled Keppra, cancelling brain MRI, and consulting Psychiatry  - will advance diet  - continues video EEG monitoring discontinued  - will continue to monitor      Disposition:  Inpatient pending evaluation by Psychiatry     SUBJECTIVE     Patient seen and examined  As per chart review and discussion with on-call team and nursing, patient had experienced numerous seizure-like episodes overnight  These episodes are characterized by whole-body jerking, more so on the upper body, that last less than 1 minute and resolved spontaneously  Patient reportedly has not been fully postictal after these events  At time of my exam, patient reportedly just had experienced 3 such similar events, roughly at 8:40 a m  He was mildly confused during my exam   He complains of diffuse headache and point tenderness on the left side of his chest that is worse with deep breaths  Otherwise, patient is currently asymptomatic, specifically denying fevers, chills, palpitations, shortness of breath, coughing, wheezing, abdominal pain, nausea, vomiting, diarrhea, constipation, or other muscular aches or pains      OBJECTIVE     Vitals:    19 0920 19 0923 19 0951 19 1432   BP: (!) 86/42 117/63 119/70 119/68   Pulse: 87 75 (!) 136 (!) 107   Resp:       Temp:       TempSrc:       SpO2: 99% 99% 97% 98%   Weight:          Temperature:   Temp (24hrs), Av 2 °F (36 8 °C), Min:98 1 °F (36 7 °C), Max:98 2 °F (36 8 °C)    Temperature: 98 1 °F (36 7 °C)  Intake & Output:  I/O       701 -  07 -  07 07 -  07    IV Piggyback  100     Total Intake(mL/kg)  100 (1 3)     Urine (mL/kg/hr)  775     Total Output  775     Net  -675 Weights:   IBW: -88 kg    Body mass index is 23 06 kg/m²  Weight (last 2 days)     Date/Time   Weight    12/05/19 1715       Comment rows:    OBSERV: pt sleeping at 12/05/19 1715 12/05/19 1600       Comment rows:    OBSERV: pt sleeping at 12/05/19 1600    12/05/19 0826   77 1 (170)            Physical Exam   Constitutional: He appears well-developed and well-nourished  He appears distressed  HENT:   Head: Normocephalic and atraumatic  Eyes: No scleral icterus  Cardiovascular: Normal rate, regular rhythm, normal heart sounds and intact distal pulses  Exam reveals no gallop and no friction rub  No murmur heard  Pulmonary/Chest: Effort normal and breath sounds normal  No stridor  No respiratory distress  He has no wheezes  He has no rales  He exhibits tenderness (Point tenderness noted at upper ribs at level mid clavicular line on left side)  Abdominal: Soft  Bowel sounds are normal  He exhibits no distension and no mass  There is no tenderness  There is no rebound and no guarding  Musculoskeletal: He exhibits no edema, tenderness or deformity  Neurological:   Patient is drowsy but arousable  Slow to respond  GCS 15  Alert and oriented x2  Remainder of neurologic examination grossly within normal limits  Skin: Skin is warm and dry  Capillary refill takes less than 2 seconds  He is not diaphoretic  Numerous small excoriations noted on exposed skin of neck, chest, and abdomen   Nursing note and vitals reviewed  LABORATORY DATA     Labs: I have personally reviewed pertinent reports    Results from last 7 days   Lab Units 12/04/19  2054   WBC Thousand/uL 7 76   HEMOGLOBIN g/dL 15 3   HEMATOCRIT % 44 8   PLATELETS Thousands/uL 277   NEUTROS PCT % 64   MONOS PCT % 10      Results from last 7 days   Lab Units 12/06/19  0444 12/04/19  2053   POTASSIUM mmol/L 3 4* 3 4*   CHLORIDE mmol/L 108 104   CO2 mmol/L 27 26   BUN mg/dL 6 6   CREATININE mg/dL 0 84 0 92   CALCIUM mg/dL 9 0 9 0   ALK PHOS U/L  --  57   ALT U/L  --  25   AST U/L  --  15     Results from last 7 days   Lab Units 12/04/19  2053   MAGNESIUM mg/dL 2 1              Results from last 7 days   Lab Units 12/04/19  2143   LACTIC ACID mmol/L 0 9     Results from last 7 days   Lab Units 12/05/19  0213   TROPONIN I ng/mL <0 02       IMAGING & DIAGNOSTIC TESTING     Radiology Results: I have personally reviewed pertinent reports  No results found  Other Diagnostic Testing: I have personally reviewed pertinent reports  ACTIVE MEDICATIONS     Current Facility-Administered Medications   Medication Dose Route Frequency    acetaminophen (TYLENOL) tablet 650 mg  650 mg Oral Q6H PRN    LORazepam (ATIVAN) 2 mg/mL injection 2 mg  2 mg Intravenous Q5 Min PRN    nicotine (NICODERM CQ) 21 mg/24 hr TD 24 hr patch 1 patch  1 patch Transdermal Daily       VTE Pharmacologic Prophylaxis: Reason for no pharmacologic prophylaxis Low risk  VTE Mechanical Prophylaxis: sequential compression device    Portions of the record may have been created with voice recognition software  Occasional wrong word or "sound a like" substitutions may have occurred due to the inherent limitations of voice recognition software    Read the chart carefully and recognize, using context, where substitutions have occurred   ==  Latonia Whitehead, [facility]  Internal Medicine Residency PGY-1

## 2019-12-06 NOTE — PROGRESS NOTES
Progress Note - Neurology   Bonnie Mullins 20 y o  male MRN: [id number]  Unit/Bed#: [facility] 698-64 Encounter: [id number]    Assessment:  Bonnie Mullins is a 20 y o  male with reported 1 seizure during childhood who presents to [facility] initially with multiple seizures and was transferred to [facility] ER AND [facility] for intractable seizures despite AED load  1  Nonepileptic seizure activity, multiple events captured on video EEG all of which were nonepileptic  Plan to discontinue continuous video EEG  Etiology likely secondary to reported untreated bipolar disorder with increased life stressors  2  Musculoskeletal chest pain, supported by reproducible tenderness to palpation of left chest wall and negative troponin's    3  Cervicalgic headache, supported by clinical presentation and tenderness to palpation of cervical neck    Plan:  -Video EEG discontinued   -MRI brain discontinued  -Pending: CSF culture, HSV type 1, 2 DNA PCR  -S/p IV Keppra load 3 g  -Discontinued Keppra 750 mg Q12H   -Plan to start Depakote for impulse disorder per behavioral health   -Seizure precautions  -Medical management per primary team  -Continue supportive care per primary team, notify with changes  -[employer] form completed   -Appreciate psych input  -No acute neurological recommendations at this time, please call with questions   -No outpatient neurology follow-up needed at this time      Imaging/Labs:   -CTA head and neck unremarkable for acute intracranial abnormalities or evidence of large intracranial aneurysm or stenosis  -S/p LP:  Fluid appears clear, total volume 8 mm, tolerated well  -CSF results WNL:  RBC, WBC, glucose, total protein, Gram stain, meningitis/encephalitis panel  -UDS positive for THC urine  -Labs WNL:  Lipase, prolactin, CK, CK-MB, ethanol, strep A PCR, influenza, lactic acid, UA, troponins    Subjective:   Per discussion with epileptologist, multiple clinical events involving diffuse vigorous shaking movements and unresponsiveness were captured on video EEG, and were nonepileptic on video monitoring  Patient states he does not recall any of his most recent seizure events  He states the only seizure event to recalls was when he was in the ambulance, which he describes as "all remember was min name being called over and over again"  Patient states last thing he remembers prior to his seizures was sitting on the couch with his friends  Patient states he has bipolar disorder, not following with behavioral medicine or on any medications and admits to increased stressors in his life  He states his bipolar disorder episodes are episodes of rage where he punches things and does not remember it  Today patient states he feels very weak  He admits to chest pain on the left that radiates down his left side which began at the time of his first seizure event  Patient admits to the chest pain worsening with deep breathes  He also states he is having some SOB, weakness in all 4 extremities, blurry vision, and dizziness  Patient admits to a headache originating in the bitemporal region, radiating to bifrontal region and posteriorly to the bioccipital region, and down his cervical neck bilaterally  He also admits to numbness in LUE  Denies double vision, N/V, abdominal pain  ROS:  12 point ROS performed, as stated above, all others negative  Vitals: Blood pressure 117/63, pulse 75, temperature 98 1 °F (36 7 °C), resp  rate 18, weight 77 1 kg (170 lb), SpO2 99 %  ,Body mass index is 23 06 kg/m²  Physical Exam   Constitutional: He appears well-developed and well-nourished  No distress  HENT:   Head: Normocephalic and atraumatic  Eyes: Pupils are equal, round, and reactive to light  Conjunctivae and EOM are normal  Right eye exhibits no discharge  Left eye exhibits no discharge  No scleral icterus  Neck: Normal range of motion  Neck supple  Cardiovascular: Normal rate and regular rhythm     Pulmonary/Chest: Effort normal and breath sounds normal  No respiratory distress  He has no wheezes  Musculoskeletal:   Diffuse tenderness to palpation of left chest wall  Tenderness to palpation of cervical neck bilaterally   Neurological: He has a normal Finger-Nose-Finger Test    Skin: Skin is warm and dry  No rash noted  He is not diaphoretic  No erythema  No pallor  Patient has marker drawings across his body   Psychiatric:   Flat affect   Nursing note and vitals reviewed  Neurologic Exam     Mental Status   Patient is alert, lying in bed  Oriented x3  No dysarthria or aphasia noted  Patient is able to name the president, names all objects provided, follows multistep commands  Able to name the day of the week and recite the days of the week backwards  Able to answer all questions appropriately     Cranial Nerves     CN II   Visual fields full to confrontation  CN III, IV, VI   Pupils are equal, round, and reactive to light  Extraocular motions are normal    Nystagmus: none   Upgaze: normal  Downgaze: normal  Conjugate gaze: present    CN V   Facial sensation intact  CN VII   Facial expression full, symmetric  CN VIII   Hearing: intact    CN IX, X   Palate: symmetric    CN XI   CN XI normal      CN XII   Tongue deviation: none    No tongue lacerations noted     Motor Exam   Muscle bulk: normal  Overall muscle tone: normal  Left drift noted, right WNL  Bilateral upper lower extremity testing 5/5 throughout     Sensory Exam   Light touch normal      Gait, Coordination, and Reflexes     Coordination   Finger to nose coordination: normal    Tremor   Resting tremor: absent  Bilateral upper extremity biceps, triceps, brachioradialis reflexes 1+ throughout  Patellar reflexes deferred secondary to pain per patient  Bilateral Achilles reflexes limited to patient not relaxing     Lab Results: I have personally reviewed pertinent reports    Recent Results (from the past 24 hour(s))   ECG 12 lead    Collection Time: 12/06/19  3:19 AM   Result Value Ref Range    Ventricular Rate 75 BPM    Atrial Rate 75 BPM    ID Interval 138 ms    QRSD Interval 106 ms    QT Interval 364 ms    QTC Interval 406 ms    P Axis -20 degrees    QRS Axis 72 degrees    T Wave Axis 47 degrees   Basic metabolic panel    Collection Time: 12/06/19  4:44 AM   Result Value Ref Range    Sodium 138 136 - 145 mmol/L    Potassium 3 4 (L) 3 5 - 5 3 mmol/L    Chloride 108 100 - 108 mmol/L    CO2 27 21 - 32 mmol/L    ANION GAP 3 (L) 4 - 13 mmol/L    BUN 6 5 - 25 mg/dL    Creatinine 0 84 0 60 - 1 30 mg/dL    Glucose 81 65 - 140 mg/dL    Calcium 9 0 8 3 - 10 1 mg/dL    eGFR 126 ml/min/1 73sq m   TSH, 3rd generation    Collection Time: 12/06/19  4:46 AM   Result Value Ref Range    TSH 3RD GENERATON 0 169 (L) 0 463 - 3 980 uIU/mL   ]  Imaging Studies: I have personally reviewed pertinent reports and I have personally reviewed pertinent films in PACS  EKG, Pathology, and Other Studies: I have personally reviewed pertinent reports  Counseling / Coordination of Care  Total time spent today 35 minutes  Greater than 50% of total time was spent with the patient and/or family counseling and/or coordination of care  A description of the counseling/coordination of care:  Patient was seen and evaluated  Discussed with attending  Chart reviewed thoroughly including laboratory and imaging studies    Plan of care discussed with patient and primary team

## 2019-12-06 NOTE — PROGRESS NOTES
Patient was requesting morphine for the chest pain  I told him it was not ordered and that the EKG was normal and that is why they would not order morphine

## 2019-12-06 NOTE — PROGRESS NOTES
Patient had upper and lower extremity seizure like activity lasting less than 1 minute  Patient complained of chest pain which he states he gets after having a seizure

## 2019-12-06 NOTE — SOCIAL WORK
Met with pt to discuss the role of CM and to discuss any help pt may need prior to dc  Pt resides with 3 roommates in a half of a double; Flakito Chow, and Adia Orantes with a 1st floor setup  Pt performed ADL's indptly pta, no use of DME  No hx of HHC or rehab  Pt denies hx of mental health or D&A treatment  No PCP; infolink card provided  Pt's preferred pharmacy is [facility] in [place]  Contact: Liliana (mother) [phone]  No POA or lving will  Pt states his family friend Arthur will transport home at dc  CM reviewed d/c planning process including the following: identifying help at home, patient preference for d/c planning needs, Discharge Lounge, [facility] Meds to Bed program, availability of treatment team to discuss questions or concerns patient and/or family may have regarding understanding medications and recognizing signs and symptoms once discharged  CM also encouraged patient to follow up with all recommended appointments after discharge  Patient advised of importance for patient and family to participate in managing patients medical well being  Patient/caregiver received discharge checklist  Content reviewed  Patient/caregiver encouraged to participate in discharge plan of care prior to discharge home

## 2019-12-06 NOTE — ED NOTES
Patient ringing call bell, entered room and found patient to be actively seizing  Dr Tyesha Ch called to bedside, NRB reapplied and 2 mg PRN ativan given       Diya Macario RN  12/05/19 1924

## 2019-12-06 NOTE — CONSULTS
Consultation - [facility]  20 y o  male MRN: [id number]  Unit/Bed#: [facility] 284-43 Encounter: [id number]      Chief Complaint:  I have issue    History of Present Illness   Physician Requesting Consult: Chacha Mtz MD  Reason for Consult / Principal Problem:  Reported bipolar disorder, presented with non epileptic seizures, new onset seizure    Leo Alfaro is a 20 y o  male with no significant past medical history presents with seizure-like activity to [address] [facility] and was transferred to [facility] [employer] [facility] for further evaluation  A consult was requested because the patient states that he was bipolar and the workup for seizures was negative  Patient states that he was told he was bipolar because he used to break things since childhood, every time he gets angry he breaks things but he does not have any other symptoms of bipolar disorder, he states that he is under lot of stress because he does not have a job at this moment, he is living with 2 friends that well homeless  After evaluation the patient he does not have any major symptoms of bipolar disorder, he does have a symptoms of impulse control disorder and some anxiety  Patient denies any suicidal thoughts plans or intent, patient denies any active psychotic symptoms  Patient denies ever treated by mental health providers other than an evaluation or ever taking any psychotropic medication  Psychiatric Review Of Systems:  sleep: no  appetite changes: no  weight changes: no  energy/anergy: no  interest/pleasure/anhedonia: no  somatic symptoms: no  anxiety/panic: no  niyah: no  guilty/hopeless: no  self injurious behavior/risky behavior: no    Historical Information   Past Psychiatric History:   None  Currently in treatment with none    Past Suicide attempts:  None  Past Violent behavior:  None  Past Psychiatric medication trial:  None    Substance Abuse History:  Patient denies any drugs or alcohol history but his toxicology was positive for marijuana     I have assessed this patient for substance use within the past 12 months     History of IP/OP rehabilitation program:  No  Smoking history:  Smoked half to 1 pack a day  Family Psychiatric History:   2 cousin has some type of mental illness    Social History  Education: 11th grade  Learning Disabilities: special education  Marital history: single  Living arrangement, social support: He live with 2 friends  Occupational History: unemployed  Functioning Relationships: good support system    Other Pertinent History: Denies any legal or  history    Traumatic History:   Abuse: Denies any  Other Traumatic Events: None    Past Medical History:   Diagnosis Date    Fracture, clavicle     at birth   [facility] Knee dislocation     Knee dislocation, left, initial encounter     Seizures ([address] )        Medical Review Of Systems:  Review of Systems - Negative except anxiety, all other systems reviewed were negative    Meds/Allergies   current meds:   Current Facility-Administered Medications   Medication Dose Route Frequency    acetaminophen (TYLENOL) tablet 650 mg  650 mg Oral Q6H PRN    LORazepam (ATIVAN) 2 mg/mL injection 2 mg  2 mg Intravenous Q5 Min PRN    nicotine (NICODERM CQ) 21 mg/24 hr TD 24 hr patch 1 patch  1 patch Transdermal Daily     Allergies   Allergen Reactions    Aloe Burn Relief [Lidocaine]        Objective   Vital signs in last 24 hours:  Temp:  [98 1 °F (36 7 °C)-98 2 °F (36 8 °C)] 98 1 °F (36 7 °C)  HR:  [] 136  Resp:  [11-20] 18  BP: ()/() 119/70      Intake/Output Summary (Last 24 hours) at 12/6/2019 1420  Last data filed at 12/6/2019 1310  Gross per 24 hour   Intake    Output 1150 ml   Net -1150 ml       Mental Status Evaluation:  Appearance:  age appropriate and disheveled   Behavior:  Cooperative   Speech:  normal pitch and normal volume   Mood:  anxious   Affect:  mood-congruent   Language: naming objects and repeating phrases   Thought Process:  normal   Associations: intact associations   Thought Content:  normal   Perceptual Disturbances: None   Risk Potential: He denies any suicidal or homicidal ideation , plan or intent   Sensorium:  person, place, time/date, situation, day of week and month of year   Memory:  recent and remote memory grossly intact   Cognition:  grossly intact   Consciousness:  alert and awake    Attention: attention span and concentration were age appropriate   Intellect: within normal limits   Fund of Knowledge: awareness of current events: Fair, past history: Fair and vocabulary: Fair   Insight:  fair   Judgment: fair   Muscle Strength and Tone: Within normal limits   Gait/Station: normal gait/station and normal balance   Motor Activity: no abnormal movements     Lab Results:    I have personally reviewed all pertinent laboratory/tests results  Labs in last 72 hours:   Recent Labs     12/04/19 2053 12/04/19 2054 12/06/19 0444 12/06/19 0446   WBC  --  7 76  --   --    RBC  --  4 82  --   --    HGB  --  15 3  --   --    HCT  --  44 8  --   --    PLT  --  277  --   --    RDW  --  12 3  --   --    NEUTROABS  --  4 94  --   --    SODIUM 139  --  138  --    K 3 4*  --  3 4*  --      --  108  --    CO2 26  --  27  --    BUN 6  --  6  --    CREATININE 0 92  --  0 84  --    GLUC 101  --  81  --    CALCIUM 9 0  --  9 0  --    AST 15  --   --   --    ALT 25  --   --   --    ALKPHOS 57  --   --   --    TP 7 4  --   --   --    ALB 4 3  --   --   --    TBILI 0 50  --   --   --    [id number]  --   --   --  0 169*   FREET4  --   --   --  1 14       Code Status: )Level 1 - Full Code    Assessment/Plan     Assessment:  Puja Cash is a 20 y o  male with no prior medical history he presented to the hospital with seizure-like activity according to Neurology they do not have any focus and the lose like pseudoseizures    Patient states that he was told he was bipolar when his was a child because he used to break things, he denies any any treatment or medications or any psych admission  According to the history really patient have history of impulse control disorder and no bipolar disorder  He denies suicidal thoughts plans or intent, he denies any psychotic symptoms  Diagnosis:  Impulse control disorder  Anxiety disorder unspecified type  Plan:   Continue medical management  I do recommend Depakote 500 mg p o  HS for his mood control disorder and his mood disorder I do not recommend at this moment any antidepressant or any benzos  Patient can be followed by the primary physician  I discussed this with primary team and Neurology  No other intervention at this moment  Patient agree with treatment plan  I will sign off  Risks, benefits and possible side effects of Medications:   Risks, benefits, and possible side effects of medications explained to patient and patient verbalizes understanding            Nanda Choe MD

## 2019-12-06 NOTE — PROGRESS NOTES
After patient's seizure  postictal testing done  He was able to say his name but in a very soft voice  He did not remember the phrase yellow car  He did follow commands by putting arms in the air

## 2019-12-06 NOTE — ED NOTES
Spoke with MACHO RICHARDSON taking care of pt  Made aware pt experienced more seizure activity        Frandy Cast RN  12/05/19 2015

## 2019-12-06 NOTE — PLAN OF CARE
Problem: Potential for Falls  Goal: Patient will remain free of falls  Description  INTERVENTIONS:  - Assess patient frequently for physical needs  -  Identify cognitive and physical deficits and behaviors that affect risk of falls  -  [place] fall precautions as indicated by assessment   - Educate patient/family on patient safety including physical limitations  - Instruct patient to call for assistance with activity based on assessment  - Modify environment to reduce risk of injury  - Consider OT/PT consult to assist with strengthening/mobility  Outcome: Progressing     Problem: SAFETY ADULT  Goal: Patient will remain free of falls  Description  INTERVENTIONS:  - Assess patient frequently for physical needs  -  Identify cognitive and physical deficits and behaviors that affect risk of falls    -  [place] fall precautions as indicated by assessment   - Educate patient/family on patient safety including physical limitations  - Instruct patient to call for assistance with activity based on assessment  - Modify environment to reduce risk of injury  -   Problem: DISCHARGE PLANNING  Goal: Discharge to home or other facility with appropriate resources  Description  INTERVENTIONS:  - Identify barriers to discharge w/patient and caregiver  - Arrange for needed discharge resources and transportation as appropriate  - Identify discharge learning needs (meds, wound care, etc )  - Arrange for interpretive services to assist at discharge as needed  - Refer to Case Management Department for coordinating discharge planning if the patient needs post-hospital services based on physician/advanced practitioner order or complex needs related to functional status, cognitive ability, or social support system  Outcome: Progressing     Problem: Knowledge Deficit  Goal: Patient/family/caregiver demonstrates understanding of disease process, treatment plan, medications, and discharge instructions  Description  Complete learning assessment and assess knowledge base    Interventions:  - Provide teaching at level of understanding  - Provide teaching via preferred learning methods  Outcome: Progressing     Problem: NEUROSENSORY - ADULT  Goal: Achieves stable or improved neurological status  Description  INTERVENTIONS  - Monitor and report changes in neurological status  - Monitor vital signs such as temperature, blood pressure, glucose, and any other labs ordered   - Initiate measures to prevent increased intracranial pressure  - Monitor for seizure activity and implement precautions if appropriate      Outcome: Progressing     Problem: NEUROSENSORY - ADULT  Goal: Achieves stable or improved neurological status  Description  INTERVENTIONS  - Monitor and report changes in neurological status  - Monitor vital signs such as temperature, blood pressure, glucose, and any other labs ordered   - Initiate measures to prevent increased intracranial pressure  - Monitor for seizure activity and implement precautions if appropriate      Outcome: Progressing     Problem: NEUROSENSORY - ADULT  Goal: Remains free of injury related to seizures activity  Description  INTERVENTIONS  - Maintain airway, patient safety  and administer oxygen as ordered  - Monitor patient for seizure activity, document and report duration and description of seizure to physician/advanced practitioner  - If seizure occurs,  ensure patient safety during seizure  - Reorient patient post seizure  - Seizure pads on all 4 side rails  - Instruct patient/family to notify RN of any seizure activity including if an aura is experienced  - Instruct patient/family to call for assistance with activity based on nursing assessment  - Administer anti-seizure medications if ordered    Outcome: Progressing     Problem: NEUROSENSORY - ADULT  Goal: Achieves maximal functionality and self care  Description  INTERVENTIONS  - Monitor swallowing and airway patency with patient fatigue and changes in neurological status  - Encourage and assist patient to increase activity and self care     - Encourage visually impaired, hearing impaired and aphasic patients to use assistive/communication devices  Outcome: Progressing   Problem: SAFETY ADULT  Goal: Maintain or return to baseline ADL function  Description  INTERVENTIONS:  -  Assess patient's ability to carry out ADLs; assess patient's baseline for ADL function and identify physical deficits which impact ability to perform ADLs (bathing, care of mouth/teeth, toileting, grooming, dressing, etc )  - Assess/evaluate cause of self-care deficits   - Assess range of motion  - Assess patient's mobility; develop plan if impaired  - Assess patient's need for assistive devices and provide as appropriate  - Encourage maximum independence but intervene and supervise when necessary  - Involve family in performance of ADLs  - Assess for home care needs following discharge   - - Provide patient education as appropriate  Outcome: Progressing     Outcome: Progressing

## 2019-12-06 NOTE — PROGRESS NOTES
Patient on admission to floor appeared to have a seizure when transferred to the bed  Upper extremity shaking less than twenty seconds  Patient is alert at times and then goes back to sleep  He was alert times four at times  He was sleeping and then woke up and asked if he had a seizure  He seemed confused as to where he was but was able to use his cell phone

## 2019-12-06 NOTE — UTILIZATION REVIEW
Initial Clinical Review    Admission: Date/Time/Statement: Inpatient Admission Orders (From admission, onward)     Ordered        12/05/19 1000  Inpatient Admission  Once                   Orders Placed This Encounter   Procedures    Inpatient Admission     Standing Status:   Standing     Number of Occurrences:   1     Order Specific Question:   Admitting Physician     Answer:   Ana Cristina Meyer     Order Specific Question:   Level of Care     Answer:   Med Surg [16]     Order Specific Question:   Bed Type     Answer:   EMU [8]     Order Specific Question:   Estimated length of stay     Answer:   More than 2 Midnights     Order Specific Question:   Certification     Answer:   I certify that inpatient services are medically necessary for this patient for a duration of greater than two midnights  See H&P and MD Progress Notes for additional information about the patient's course of treatment  12/4/2019 - 12/5/2019 (10 hours)  [facility] Emergency Department  Transfer to [facility] for seizure evaluation new onset  Prior to arrival iv mso4, iv ceftriaxone, iv fluids, iv keppra, iv vancomycin            ED Arrival Information     Expected Arrival Acuity Means of Arrival Escorted By Service Admission Type    12/5/2019 12/5/2019 08:20 [facility] [place] [facility]    Arrival Complaint    Seizures        Chief Complaint   Patient presents with    Seizure - Prior Hx Of     Pt is SOD transfer for Seizure  Per EMS transfer, pt had witnessed "upper body" seizure right after they left [place]  Lasting about 30 seconds  No medication given my EMS  Pt c/o headache and chest pain  Assessment/Plan:    20year old male received from [address] [place] [address] ed for evaluation and treatment of active full tonic clonic seizure activity  Treated with iv ativan with cessation  loaded with iv keppra  Friend's state  that he had 8 episodes today     Drug screen positive for THC  Past medical history of 1 seizure in childhodwith  Lumbar puncture done prior to arrival without significant finding  Gcs fluctuating  7-14  Admit to inpatient for  Neurology consulted   Plan video eeg monitoring, seizure precautions,  Telemetry,  Neuro checks, consult psychiatry    ED Triage Vitals [12/05/19 0826]   Temperature Pulse Respirations Blood Pressure SpO2   97 6 °F (36 4 °C) 67 15 119/56 98 %      Oral          Pain Score       8       12/05/19 77 1 kg (170 lb)     Additional Vital Signs:       Date/Time  Temp  Pulse  Resp  BP  MAP (mmHg)  SpO2  O2 Device  Patient Position - Orthostatic VS   12/06/19 15:25:52    136Abnormal     154/80  105  97 %       12/06/19 14:32:56    107Abnormal     119/68  85  98 %       12/06/19 09:51:46    136Abnormal     119/70  86  97 %       12/06/19 09:23:29    75    117/63  81  99 %       12/06/19 09:20:50    87    86/42Abnormal   57  99 %       12/06/19 08:28:43    93    117/91  100  99 %       12/06/19 07:56:46  98 1 °F (36 7 °C)  64  18  121/75  90  100 %       12/06/19 03:15:29    73  18  107/63  78  100 %       12/06/19 02:38:27    79    131/71  91  100 %       12/06/19 02:37:50    82    131/71  91  100 %       12/06/19 00:38:26    65    104/53  70  98 %       12/05/19 2353              None (Room air)     12/05/19 23:41:56  98 2 °F (36 8 °C)  73  18  152/128Abnormal   136  100 %       12/05/19 2300    52Abnormal   13  94/55    100 %  None (Room air)  Lying   12/05/19 2200    60  16  102/51    98 %  None (Room air)  Lying   12/05/19 2136        98/55           12/05/19 2100    56  20  87/50Abnormal     97 %  None (Room air)  Lying   12/05/19 2034        88/51Abnormal            12/05/19 1930    62  14  119/85    97 %  None (Room air)  Lying   12/05/19 1900    72  14  119/85    99 %  None (Room air)  Lying   12/05/19 1818    66    104/64    99 %    Lying   12/05/19 1715   58  12  100/57    98 %  None (Room air)  Lying   Comment rows:   OBSERV: pt sleeping at 12/05/19 1715   12/05/19 1702    98  16  94/54    99 %  None (Room air)  Lying   12/05/19 1700    56  16  94/54    99 %       12/05/19 1600    58  14  103/57    98 %  None (Room air)  Lying   Comment rows:   OBSERV: pt sleeping at 12/05/19 1600   12/05/19 1530    56  14  102/52    98 %  None (Room air)  Lying   12/05/19 1500    62  11Abnormal   101/57    98 %  None (Room air)  Lying   12/05/19 1430    58  12  102/59    98 %       12/05/19 1400    56  14  99/52    98 %       12/05/19 1330    59  14  99/53    98 %       12/05/19 1300    59  14  97/54    97 %       12/05/19 1230    67  14  99/50    98 %       12/05/19 1200    66  14  95/49Abnormal     97 %       12/05/19 1130    64  14  96/50    98 %       12/05/19 1108    64  14  96/54    98 %  None (Room air)  Lying   12/05/19 1030    69  18  96/49Abnormal     97 %       12/05/19 1000    67  18  99/52    99 %  None (Room air)  Lying   12/05/19 0930    80  15  95/53    97 %       12/05/19 0900    79  15  103/55    96 %         Date and Time Eye Opening Best Verbal Response Best Motor Response Vilonia Coma Scale Score   12/06/19 0400 2 5 4 11   12/05/19 2353 2 5 4 11   12/05/19 2200 2 5 4 11   12/05/19 2015 2 5 4 11   12/05/19 1927 3 5 6 14   12/05/19 1900 3 5 6 14   12/05/19 1800 3 5 4 12   12/05/19 1700 2 5 4 11   12/05/19 1600 2 1 4 7   12/05/19 1500 2 4 4 10   12/05/19 1400 2 4 4 10   12/05/19 1330 2 1 4 7   12/05/19 1226 2 1 4 7   12/05/19 1030 2 1 4 7   12/05/19 0930 2 1 4 7   12/05/19 0833 3 4 6 13         Pertinent Labs/Diagnostic Results:       CTA  head prior to arrival  No acute finding     Results from last 7 days   Lab Units 12/04/19  2054   WBC Thousand/uL 7 76   HEMOGLOBIN g/dL 15 3   HEMATOCRIT % 44 8   PLATELETS Thousands/uL 277   NEUTROS ABS Thousands/µL 4 94         Results from last 7 days   Lab Units 12/06/19 0444 12/04/19 2053   SODIUM mmol/L 138 139   POTASSIUM mmol/L 3 4* 3 4*   CHLORIDE mmol/L 108 104   CO2 mmol/L 27 26   ANION GAP mmol/L 3* 9   BUN mg/dL 6 6   CREATININE mg/dL 0 84 0 92   EGFR ml/min/1 73sq m 126 119   CALCIUM mg/dL 9 0 9 0   MAGNESIUM mg/dL  --  2 1     Results from last 7 days   Lab Units 12/04/19  2053   AST U/L 15   ALT U/L 25   ALK PHOS U/L 57   TOTAL PROTEIN g/dL 7 4   ALBUMIN g/dL 4 3   TOTAL BILIRUBIN mg/dL 0 50     Results from last 7 days   Lab Units 12/05/19  0855   POC GLUCOSE mg/dl 90     Results from last 7 days   Lab Units 12/06/19 0444 12/04/19 2053   GLUCOSE RANDOM mg/dL 81 101       Results from last 7 days   Lab Units 12/04/19 2053   CK TOTAL U/L 191   CK MB INDEX % <1 0   CK MB ng/mL 0 7     Results from last 7 days   Lab Units 12/05/19  0213   TROPONIN I ng/mL <0 02             Results from last 7 days   Lab Units 12/06/19  0446   TSH 3RD GENERATON uIU/mL 0 169*         Results from last 7 days   Lab Units 12/04/19  2143   LACTIC ACID mmol/L 0 9     Results from last 7 days   Lab Units 12/04/19  2053   PROLACTIN ng/mL 8 4                     Results from last 7 days   Lab Units 12/04/19  2053   LIPASE u/L 82             Results from last 7 days   Lab Units 12/04/19  2307   CLARITY UA  Clear   COLOR UA  Yellow   SPEC GRAV UA  <=1 005   PH UA  7 0   GLUCOSE UA mg/dl Negative   KETONES UA mg/dl Negative   BLOOD UA  Negative   PROTEIN UA mg/dl Negative   NITRITE UA  Negative   BILIRUBIN UA  Negative   UROBILINOGEN UA E U /dl 0 2   LEUKOCYTES UA  Trace*   WBC UA /hpf 0-1*   RBC UA /hpf None Seen   BACTERIA UA /hpf Occasional   EPITHELIAL CELLS WET PREP /hpf None Seen     Results from last 7 days   Lab Units 12/04/19  2141   INFLUENZA A PCR  None Detected   RSV PCR  None Detected     Results from last 7 days   Lab Units 12/04/19  2306   AMPH/METH  Negative   BARBITURATE UR  Negative   BENZODIAZEPINE UR  Negative   COCAINE UR  Negative   METHADONE URINE  Negative   OPIATE UR  Negative   PCP UR  Negative   THC UR  Positive*     Results from last 7 days   Lab Units 12/04/19  2139   ETHANOL LVL mg/dL <3   ACETAMINOPHEN LVL ug/mL <0 2*   SALICYLATE LVL mg/dL 4 6           Results from last 7 days   Lab Units 12/05/19  0211 12/05/19  0003 12/04/19  2141   BLOOD CULTURE  No Growth at 24 hrs   --  No Growth at 24 hrs  GRAM STAIN RESULT   --  No Polys or Bacteria seen  --          Results from last 7 days   Lab Units 12/05/19  0003   APPEARANCE CSF  Clear, Colorless   TUBE NUM CSF  4   WBC CSF /uL 0   XANTHOCHROMIA  No   GLUCOSE CSF mg/dL 60   PROTEIN CSF mg/dL 42   RBC CSF uL 0  0   CSF CULTURE  No growth         Medications:   Scheduled Medications:    Medications:  divalproex sodium 500 mg Oral HS   nicotine 1 patch Transdermal Daily     Continuous IV Infusions:     PRN Meds:    acetaminophen 650 mg Oral Q6H PRN   LORazepam 2 mg Intravenous Q5 Min PRN           Network Utilization Review Department  [email or web address]  ATTENTION: Please call with any questions or concerns to [phone] and carefully listen to the prompts so that you are directed to the right person  All voicemails are confidential   Finis Feeling all requests for admission clinical reviews, approved or denied determinations and any other requests to dedicated fax number below belonging to the campus where the patient is receiving treatment   List of dedicated fax numbers for the Facilities:  [address] [place] [address] DENIALS (Administrative/Medical Necessity) [phone]   [address]  (Maternity/NICU/Pediatrics) [phone]     [facility] [phone]   [facility] [phone]   Mick Standing [phone]   [facility] [address] [facility] [address] [phone]     [facility] [phone]   [address] [place] [address] Avenue [phone]   Pertinent Labs/Diagnostic Test Results:       Results from last 7 days   Lab Units 12/04/19  2054   WBC Thousand/uL 7 76   HEMOGLOBIN g/dL 15 3   HEMATOCRIT % 44 8   PLATELETS Thousands/uL 277   NEUTROS ABS Thousands/µL 4 94         Results from last 7 days   Lab Units 12/06/19  0444 12/04/19  2053   SODIUM mmol/L 138 139   POTASSIUM mmol/L 3 4* 3 4*   CHLORIDE mmol/L 108 104   CO2 mmol/L 27 26   ANION GAP mmol/L 3* 9   BUN mg/dL 6 6   CREATININE mg/dL 0 84 0 92   EGFR ml/min/1 73sq m 126 119   CALCIUM mg/dL 9 0 9 0   MAGNESIUM mg/dL  --  2 1     Results from last 7 days   Lab Units 12/04/19  2053   AST U/L 15   ALT U/L 25   ALK PHOS U/L 57   TOTAL PROTEIN g/dL 7 4   ALBUMIN g/dL 4 3   TOTAL BILIRUBIN mg/dL 0 50     Results from last 7 days   Lab Units 12/05/19  0855   POC GLUCOSE mg/dl 90     Results from last 7 days   Lab Units 12/06/19  0444 12/04/19  2053   GLUCOSE RANDOM mg/dL 81 101       Results from last 7 days   Lab Units 12/04/19  2053   CK TOTAL U/L 191   CK MB INDEX % <1 0   CK MB ng/mL 0 7     Results from last 7 days   Lab Units 12/05/19  0213   TROPONIN I ng/mL <0 02             Results from last 7 days   Lab Units 12/06/19  0446   TSH 3RD GENERATON uIU/mL 0 169*         Results from last 7 days   Lab Units 12/04/19  2143   LACTIC ACID mmol/L 0 9     Results from last 7 days   Lab Units 12/04/19  2053   PROLACTIN ng/mL 8 4                     Results from last 7 days   Lab Units 12/04/19  2053   LIPASE u/L 82             Results from last 7 days   Lab Units 12/04/19  2307   CLARITY UA  Clear   COLOR UA  Yellow   SPEC GRAV UA  <=1 005   PH UA  7 0   GLUCOSE UA mg/dl Negative   KETONES UA mg/dl Negative   BLOOD UA  Negative   PROTEIN UA mg/dl Negative   NITRITE UA  Negative   BILIRUBIN UA  Negative   UROBILINOGEN UA E U /dl 0 2   LEUKOCYTES UA  Trace*   WBC UA /hpf 0-1*   RBC UA /hpf None Seen   BACTERIA UA /hpf Occasional   EPITHELIAL CELLS WET PREP /hpf None Seen     Results from last 7 days   Lab Units 12/04/19  2141   INFLUENZA A PCR  None Detected   RSV PCR  None Detected     Results from last 7 days   Lab Units 12/04/19  2306   AMPH/METH  Negative   BARBITURATE UR  Negative   BENZODIAZEPINE UR  Negative   COCAINE UR  Negative   METHADONE URINE  Negative   OPIATE UR  Negative   PCP UR  Negative   THC UR  Positive*     Results from last 7 days   Lab Units 12/04/19  2139   ETHANOL LVL mg/dL <3   ACETAMINOPHEN LVL ug/mL <7 3*   SALICYLATE LVL mg/dL 4 6                 Results from last 7 days   Lab Units 12/05/19  0211 12/05/19  0003 12/04/19  2141   BLOOD CULTURE  No Growth at 24 hrs   --  No Growth at 24 hrs     GRAM STAIN RESULT   --  No Polys or Bacteria seen  --          Results from last 7 days   Lab Units 12/05/19  0003   APPEARANCE CSF  Clear, Colorless   TUBE NUM CSF  4   WBC CSF /uL 0   XANTHOCHROMIA  No   GLUCOSE CSF mg/dL 60   PROTEIN CSF mg/dL 42   RBC CSF uL 0  0   CSF CULTURE  No growth       ED Treatment:   Medication Administration from 12/05/2019 0718 to 12/05/2019 2326       Date/Time Order Dose Route Action     12/05/2019 0838 LORazepam (ATIVAN) 2 mg/mL injection 2 mg 2 mg Intravenous Given     12/05/2019 0837 LORazepam (ATIVAN) 2 mg/mL injection 2 mg 2 mg Intravenous Given     12/05/2019 1915 LORazepam (ATIVAN) 2 mg/mL injection 2 mg 2 mg Intravenous Given by Other     12/05/2019 1813 LORazepam (ATIVAN) 2 mg/mL injection 2 mg 2 mg Intravenous Given by Other     12/05/2019 2132 levETIRAcetam (KEPPRA) 750 mg in sodium chloride 0 9 % 100 mL IVPB 750 mg Intravenous New Bag     12/05/2019 1103 levETIRAcetam (KEPPRA) 750 mg in sodium chloride 0 9 % 100 mL IVPB 750 mg Intravenous New Bag        Past Medical History:   Diagnosis Date    Fracture, clavicle     at birth   [place] Knee dislocation     Knee dislocation, left, initial encounter     Seizures ([address] )      Present on Admission:  [address] onset seizure ([facility] )   Pleuritic chest pain      Admitting Diagnosis: Seizures ([address] ) [R56 9]  New onset seizure ([facility] [address] [facility] ) [R56 9]     Age/Sex: 20 y o  male     Admission Orders:    Scheduled Medications:    Medications:  divalproex sodium 500 mg Oral HS   nicotine 1 patch Transdermal Daily     Continuous IV Infusions:     PRN Meds:    acetaminophen 650 mg Oral Q6H PRN   LORazepam 2 mg Intravenous Q5 Min PRN       IP CONSULT TO NEUROLOGY  IP CONSULT TO CASE MANAGEMENT  IP CONSULT TO PSYCHIATRY    Network Utilization Review Department  [email or web address]  ATTENTION: Please call with any questions or concerns to [phone] and carefully listen to the prompts so that you are directed to the right person  All voicemails are confidential   Tanana Glass all requests for admission clinical reviews, approved or denied determinations and any other requests to dedicated fax number below belonging to the campus where the patient is receiving treatment   List of dedicated fax numbers for the Facilities:  [address] DENIALS (Administrative/Medical Necessity) [phone]   [address] (Maternity/NICU/Pediatrics) [phone]   Keith Mayers [phone]   Ursula Comes [phone]   Mariya Costa [phone]   [address] [facility] 1525 [facility] [phone]   [facility]  [phone]   Wendy Sanz 2000 [facility] [address] [place] [address] 1000 [facility] [phone]

## 2019-12-06 NOTE — QUICK NOTE
Witnessed seizure-like event at about 0310  Pt with thrashing of all four limbs, no vocalization, no tongue-biting or other injury, eyes remained shut throughout event, no loss of bowel/bladder control  Episode lasted less than 2 minutes  Pt alert/oriented after event, able to recall 2 word phrase at 3 minutes post event  No ativan given  Will continue to monitor  Pt continues to complain of headache and chest pain  Chest pain reproducible upon palpation, EKG not concerning for ACS  Will give acetaminophen 650mg, if pain persists, can give toradol instead  Will continue to monitor       Toi Garcia  PGY1

## 2019-12-06 NOTE — QUICK NOTE
Brief Preliminary Continuous Video EEG Note  There were clinical events involving diffuse, often vigorous, shaking movements and unresponsiveness at 02:31 and 03:10  Both events were nonepileptic  Monitoring to continue  Final report to follow on 12/7/2019       Markos Maloney MD  12/6/2019 3:17 AM   Damián Role: Neuro - EMU Call

## 2019-12-06 NOTE — ED NOTES
Pt found to be actively seizing by ED RN  ER resident brought to bedside   Seizure less than two minutes and ED resident Dr Cathi Melgoza told ED RN to hold  Floyd Gramajo, KIMBERLY  12/05/19 2014

## 2019-12-06 NOTE — QUICK NOTE
Spoke to patient after being paged to bedside  He was AOx3  No evidence of tongue biting however patient says he is sore  No focal neurologic deficit  GCS 15, he is following commands  Reached out to on-call epileptologist  Patient is not currently on monitoring  Described events, patients exam post event, and patients current treatment plan  As patient is still answering questions and following commands, will hold off escalating care for now  Closely monitor patients mental status exam, GCS score, and ability to follow commands

## 2019-12-06 NOTE — PROGRESS NOTES
Several events with the EMU tech calling nurse the nurse to the room  Each event less than a minute with upper extremity tremors then to lower extremities  Patient picks up momentum and seizure activity increases in speed  Patient noted to have his eyes partially closed and eyes noted to be looking to the left  Patient responded the same to each seizure activity by saying his name, "Lelia Kessler", in a very soft voice  He would partially raise his arms up  He remembered the code phrase given to him

## 2019-12-06 NOTE — ASSESSMENT & PLAN NOTE
- during exam this morning, patient complains of intermittent it is severe migratory pain in his chest that is worse with breathing  - findings concerning for costochondritis  - continue Tylenol p r n

## 2019-12-07 VITALS
TEMPERATURE: 97.8 F | BODY MASS INDEX: 26.04 KG/M2 | HEIGHT: 71 IN | RESPIRATION RATE: 18 BRPM | SYSTOLIC BLOOD PRESSURE: 123 MMHG | HEART RATE: 52 BPM | WEIGHT: 186 LBS | DIASTOLIC BLOOD PRESSURE: 66 MMHG | OXYGEN SATURATION: 99 %

## 2019-12-07 PROBLEM — R56.9 NEW ONSET SEIZURE (HCC): Status: RESOLVED | Noted: 2019-12-05 | Resolved: 2019-12-07

## 2019-12-07 LAB
ANION GAP SERPL CALCULATED.3IONS-SCNC: 4 MMOL/L (ref 4–13)
BUN SERPL-MCNC: 17 MG/DL (ref 5–25)
CALCIUM SERPL-MCNC: 8.8 MG/DL (ref 8.3–10.1)
CHLORIDE SERPL-SCNC: 110 MMOL/L (ref 100–108)
CO2 SERPL-SCNC: 26 MMOL/L (ref 21–32)
CREAT SERPL-MCNC: 1.02 MG/DL (ref 0.6–1.3)
GFR SERPL CREATININE-BSD FRML MDRD: 105 ML/MIN/1.73SQ M
GLUCOSE SERPL-MCNC: 92 MG/DL (ref 65–140)
POTASSIUM SERPL-SCNC: 3.8 MMOL/L (ref 3.5–5.3)
SODIUM SERPL-SCNC: 140 MMOL/L (ref 136–145)

## 2019-12-07 PROCEDURE — 95951 PR EEG MONITORING/VIDEORECORD: CPT | Performed by: PSYCHIATRY & NEUROLOGY

## 2019-12-07 PROCEDURE — 80048 BASIC METABOLIC PNL TOTAL CA: CPT | Performed by: STUDENT IN AN ORGANIZED HEALTH CARE EDUCATION/TRAINING PROGRAM

## 2019-12-07 PROCEDURE — ND001 PR NO DOCUMENTATION: Performed by: INTERNAL MEDICINE

## 2019-12-07 RX ORDER — DIVALPROEX SODIUM 500 MG/1
500 TABLET, DELAYED RELEASE ORAL
Qty: 30 TABLET | Refills: 0 | Status: SHIPPED | OUTPATIENT
Start: 2019-12-07

## 2019-12-07 RX ADMIN — ACETAMINOPHEN 650 MG: 325 TABLET ORAL at 16:02

## 2019-12-07 RX ADMIN — NICOTINE 1 PATCH: 21 PATCH, EXTENDED RELEASE TRANSDERMAL at 05:21

## 2019-12-07 NOTE — DISCHARGE INSTRUCTIONS
For chest pain, you may take Tylenol or NSAIDs (ibuprofen, naproxen, meloxicam, etc) as needed per the directions on the bottle

## 2019-12-07 NOTE — UTILIZATION REVIEW
Notification of Inpatient Admission/Inpatient Authorization Request   This is a Notification of Inpatient Admission for [address]  Be advised that this patient was admitted to our facility under Inpatient Status  Contact Cristobal Lewis at [phone] for additional admission information  Sophia Oliver  DEPT  DEDICATED [phone]  Patient Name:   Loerna Maciel   YOB: 2000       [address]   [address]:   [place]  Tax ID: [id number]  NPI: [id number] Attending Provider/NPI: Sherri Leach Md [[id number]]      Place of Service Code: 24     Place of Service Name:  [address] [place] [address]   Start Date: 12/5/19 8731     Discharge Date & Time: No discharge date for patient encounter  Type of Admission: Inpatient Status Discharge Disposition (if discharged): [address] [place] [address]   Patient Diagnoses: Seizures ([facility] [address] [facility] ) [R56 9]  New onset seizure [facility]) [R56 9]     Orders: Admission Orders (From admission, onward)     Ordered        12/05/19 1000  Inpatient Admission  Once                    Assigned Utilization Review Contact: Cristobal Lewis  Utilization   Network Utilization Review Department  Phone: [phone]; Fax [phone]  Email: [email or web address]   ATTENTION PAYERS: Please call the assigned Utilization  directly with any questions or concerns ALL voicemails in the department are confidential  Send all requests for admission clinical reviews, approved or denied determinations and any other requests to dedicated fax number belonging to the campus where the patient is receiving treatment

## 2019-12-08 LAB
BACTERIA CSF CULT: NO GROWTH
HSV1 DNA SPEC QL NAA+PROBE: NEGATIVE
HSV2 DNA SPEC QL NAA+PROBE: NEGATIVE

## 2019-12-08 NOTE — DISCHARGE SUMMARY
[facility] CENTRAL Discharge Summary - Medical Maria Ines Fox 20 y o  male MRN: [id number]    [address] [facility]  Room / Bed: [facility] 719/[facility] 301-10 Encounter: [id number]    BRIEF OVERVIEW    Admitting Provider: Shay Morejon MD  Discharge Provider: No att  providers found  Primary Care Physician at Discharge: None at time of DC, patient is advised to establish care as soon as possible    [address] [facility]  Admission Date: 12/5/2019     Discharge Date: 12/7/2019  6:03 1303 Ashley Joe is a 20year old male with no significant past medical history who presented to [address] [place] [address],[address] following 8 episodes of seizure like activity  Noted to be full body tonic clonic seizures lasting less than 1 minute with quick return to baseline mentation  No incontinence or tongue biting was noted in any of these episodes  He was transferred to [facility] for video EEG monitoring following 3 g Keppra load and lumbar puncture  [place] [facility] was noted to have seizure episode received 2 g Ativan x2  On arrival, patient's labs indicated mild hypokalemia  CSF studies largely unremarkable, Gram stain negative for polys or bacteria, total protein 42, WBC 0, glucose 60  Blood and CSF cultures negative for any growth  Low TSH with normal free T4, indicative of subclinical hyperthyroidism  UDS positive only for THC  Acetaminophen level less than 2 0  Salicylate level 4 6  Prolactin 8 4  Negative strep a, influenza a, influenza B, and RSV PCRs  Over course of patient's stay, he was noted to have multiple brief seizure-like episodes that were noted by Neurology, nursing, and primary medicine team as well as video EEG monitoring  These events were not consistent with seizure-like activity  Patient was referred for evaluation by Psychiatry, who determined that patient is experiencing and impulse control type of mood disorder    Their recommendation included administration of Depakote 500 mg q h s , to which the patient responded well  Additionally, throughout stay, patient reported intermittent mild headaches and intermittent and reproducible localized chest pain, consistent with costochondritis  On day of discharge, patient reports similar brief episodes overnight, mild intermittent headache, and mild reproducible chest pain all consistent with previous exams  He did state that he felt overall much better since starting the Depakote  Review of systems otherwise negative, with patient specifically denying fevers, chills, night sweats, palpitations, shortness of breath, cough, wheezing, abdominal pain, nausea, vomiting, diarrhea, constipation, or other muscular aches or pains  Physical Exam   Constitutional: He is oriented to person, place, and time  He appears well-developed and well-nourished  No distress  HENT:   Head: Normocephalic and atraumatic  Eyes: Pupils are equal, round, and reactive to light  Conjunctivae are normal    Neck: Neck supple  Cardiovascular: Normal rate, regular rhythm, normal heart sounds and intact distal pulses  Exam reveals no gallop and no friction rub  No murmur heard  Pulmonary/Chest: Effort normal and breath sounds normal  No stridor  No respiratory distress  He has no rales  He exhibits tenderness  Abdominal: Soft  Bowel sounds are normal  He exhibits no distension and no mass  There is no tenderness  There is no rebound and no guarding  Musculoskeletal: Normal range of motion  He exhibits no edema, tenderness or deformity  Lymphadenopathy:     He has no cervical adenopathy  Neurological: He is alert and oriented to person, place, and time  No cranial nerve deficit  Skin: Skin is warm and dry  Capillary refill takes less than 2 seconds  No rash noted  He is not diaphoretic  No erythema  No pallor  Nursing note and vitals reviewed          Presenting Problem/History of Present Illness  Principal Problem (Resolved):    New onset seizure [facility])  Active Problems:    Pleuritic chest pain        Diagnostic Procedures Performed  Imaging Studies:  CTA head and neck with and without contrast 12/04/2019 revealed no evidence of large intracranial aneurysm or stenosis  Chest x-ray 12/04/2019 revealed no acute cardiopulmonary disease  Pertinent Labs:  See above    Therapeutic Procedures Performed  None    Test Results Pending at Discharge:  None     Medications     Medication List to be Continued at Discharge  Discharge Medication List as of 12/7/2019  3:07 PM        Discharge Medication List as of 12/7/2019  3:07 PM      START taking these medications    Details   divalproex sodium (DEPAKOTE) 500 mg EC tablet Take 1 tablet (500 mg total) by mouth daily at bedtime, Starting Sat 12/7/2019, Normal           Discharge Medication List as of 12/7/2019  3:07 PM          Allergies  Allergies   Allergen Reactions    Aloe Burn Relief [Lidocaine]      Discharge Diet: regular diet  Activity restrictions: none  Discharge Condition: good  Discharged With Lines: no    Discharge Disposition: [facility] / Family Member Name:  Derrick Gray (mother)   Phone Number: [phone]     Outpatient Follow-Up  Patient is advised to establish a primary care physician as soon as possible and be evaluated within 7-14 days of discharge  Code Status: Prior  Advance Directive and Living Will: <no information>  Power of :    POLST:      Discharge  Statement   I spent 45 minutes minutes discharging the patient  This time was spent on the day of discharge  I had direct contact with the patient on the day of discharge  Additional documentation is required if more than 30 minutes were spent on discharge     Patricia Rice DO

## 2019-12-10 LAB
BACTERIA BLD CULT: NORMAL
BACTERIA BLD CULT: NORMAL

## 2019-12-30 ENCOUNTER — TELEPHONE (OUTPATIENT)
Dept: NEUROLOGY | Facility: CLINIC | Age: 19
End: 2019-12-30

## 2020-03-29 ENCOUNTER — APPOINTMENT (EMERGENCY)
Dept: RADIOLOGY | Facility: HOSPITAL | Age: 20
End: 2020-03-29
Payer: COMMERCIAL

## 2020-03-29 ENCOUNTER — HOSPITAL ENCOUNTER (EMERGENCY)
Facility: HOSPITAL | Age: 20
Discharge: HOME | End: 2020-03-29
Attending: EMERGENCY MEDICINE
Payer: COMMERCIAL

## 2020-03-29 VITALS
BODY MASS INDEX: 26.31 KG/M2 | OXYGEN SATURATION: 99 % | HEART RATE: 78 BPM | WEIGHT: 194.22 LBS | SYSTOLIC BLOOD PRESSURE: 104 MMHG | RESPIRATION RATE: 18 BRPM | TEMPERATURE: 99.3 F | DIASTOLIC BLOOD PRESSURE: 64 MMHG | HEIGHT: 72 IN

## 2020-03-29 DIAGNOSIS — Z77.098 CHEMICAL EXPOSURE: Primary | ICD-10-CM

## 2020-03-29 PROCEDURE — 96374 THER/PROPH/DIAG INJ IV PUSH: CPT

## 2020-03-29 PROCEDURE — 99284 EMERGENCY DEPT VISIT MOD MDM: CPT | Performed by: PHYSICIAN ASSISTANT

## 2020-03-29 PROCEDURE — 99283 EMERGENCY DEPT VISIT LOW MDM: CPT

## 2020-03-29 PROCEDURE — 71045 X-RAY EXAM CHEST 1 VIEW: CPT

## 2020-03-29 RX ADMIN — SODIUM BICARBONATE 3 MEQ: 84 INJECTION INTRAVENOUS at 20:01

## 2020-03-29 NOTE — ED PROVIDER NOTES
History  Chief Complaint   Patient presents with    Chemical Exposure     pt states around 1330 today he mixed bleach and ammonia while cleaning  pt now c/o burning in chest, throat, and abdomen, dizziness, and vomiting     Patient presents to the emergency department today for evaluation after chemical exposure  He presents ambulatory via private vehicle  Offers complaint of a sore throat burning sensation in his chest and abdomen  He states him and another family member mixed ammonia and chlorine bleach in an attempt to clean up cat piss" from a home  He denies any feelings of throat closure  No fevers  Prior to Admission Medications   Prescriptions Last Dose Informant Patient Reported? Taking?   divalproex sodium (DEPAKOTE) 500 mg EC tablet   No No   Sig: Take 1 tablet (500 mg total) by mouth daily at bedtime      Facility-Administered Medications: None       Past Medical History:   Diagnosis Date    Fracture, clavicle     at birth    Knee dislocation     Knee dislocation, left, initial encounter     Seizures ([facility] [address] )        Past Surgical History:   Procedure Laterality Date    NO PAST SURGERIES         Family History   Problem Relation Age of Onset    No Known Problems Mother     Heart disease Father      I have reviewed and agree with the history as documented  E-Cigarette/Vaping    E-Cigarette Use Never User      E-Cigarette/Vaping Substances     Social History     Tobacco Use    Smoking status: Current Every Day Smoker     Packs/day: 2 00     Years: 13 00     Pack years: 26 00     Types: Cigarettes    Smokeless tobacco: Never Used    Tobacco comment: Refused   Substance Use Topics    Alcohol use: Not Currently    Drug use: Not Currently     Types: Marijuana       Review of Systems   Constitutional: Negative  Negative for activity change, appetite change, chills, diaphoresis, fatigue, fever and unexpected weight change  HENT: Positive for sore throat   Negative for trouble swallowing and voice change  Eyes: Negative  Respiratory: Negative  Negative for cough, chest tightness, shortness of breath and wheezing  Cardiovascular: Negative  Negative for chest pain, palpitations and leg swelling  Gastrointestinal: Negative  Negative for abdominal pain, blood in stool, nausea and vomiting  Endocrine: Negative  Genitourinary: Negative  Negative for flank pain and hematuria  Musculoskeletal: Negative  Negative for arthralgias, back pain, gait problem, joint swelling, myalgias, neck pain and neck stiffness  Skin: Negative  Negative for rash and wound  Allergic/Immunologic: Negative  Neurological: Negative  Negative for dizziness, seizures, syncope, weakness, light-headedness and headaches  Hematological: Negative  Psychiatric/Behavioral: Negative  All other systems reviewed and are negative  Physical Exam  Physical Exam   Constitutional: He is oriented to person, place, and time  Vital signs are normal  He appears well-developed and well-nourished  He does not have a sickly appearance  He does not appear ill  No distress  HENT:   Right Ear: External ear normal  No swelling  Tympanic membrane is not bulging  Left Ear: External ear normal  No swelling  Tympanic membrane is not bulging  Nose: Nose normal    Mouth/Throat: Oropharynx is clear and moist  No oropharyngeal exudate  There is no evidence of any intraoral edema  No erythema  No stridor   Eyes: Pupils are equal, round, and reactive to light  Conjunctivae, EOM and lids are normal    Neck: Normal range of motion  Neck supple  No JVD present  No tracheal deviation, no edema and normal range of motion present  No thyromegaly present  Cardiovascular: Normal rate, regular rhythm, normal heart sounds, intact distal pulses and normal pulses  Exam reveals no gallop and no friction rub  No murmur heard  Pulmonary/Chest: Effort normal and breath sounds normal  No stridor   No respiratory distress  He has no wheezes  He has no rales  He exhibits no tenderness  No Evidence wheezing  Abdominal: Soft  Bowel sounds are normal  He exhibits no distension and no mass  There is no tenderness  There is no rebound, no guarding and negative Bergman's sign  No hernia  Musculoskeletal: Normal range of motion  He exhibits no edema or tenderness  Lymphadenopathy:     He has no cervical adenopathy  Neurological: He is alert and oriented to person, place, and time  He has normal strength and normal reflexes  No cranial nerve deficit or sensory deficit  GCS eye subscore is 4  GCS verbal subscore is 5  GCS motor subscore is 6  Skin: Skin is warm and dry  Capillary refill takes less than 2 seconds  No rash noted  He is not diaphoretic  No erythema  No pallor  Psychiatric: He has a normal mood and affect  His speech is normal and behavior is normal    Vitals reviewed  Vital Signs  ED Triage Vitals [03/29/20 1839]   Temperature Pulse Respirations Blood Pressure SpO2   99 3 °F (37 4 °C) 78 18 104/64 99 %      Temp Source Heart Rate Source Patient Position - Orthostatic VS BP Location FiO2 (%)   Temporal Monitor Sitting Left arm --      Pain Score       7           Vitals:    03/29/20 1839   BP: 104/64   Pulse: 78   Patient Position - Orthostatic VS: Sitting         Visual Acuity      ED Medications  Medications   sodium bicarbonate 8 4 % injection 25 mEq (3 mEq Intravenous Given 3/29/20 2001)       Diagnostic Studies  Results Reviewed     None                 XR chest 1 view portable   ED Interpretation by Isabella Reaves PA-C (03/29 1911)   No evidence of acute cardiopulmonary process      Final Result by Kaitlyn Mendoza MD (03/29 2010)      No focal consolidation              Workstation performed: [id number]                    Procedures  Procedures         ED Course  ED Course as of Mar 29 2022   Pamelia Mail Mar 29, 2020   1840 Blood Pressure: 104/64   1840 Temperature: 99 3 °F (37 4 °C)   1840 Temp Source: Temporal   1840 Pulse: 78   1840 Respirations: 18   1840 SpO2: 99 %   1931 Consulting on call toxicology  2021 FINDINGS:     Cardiomediastinal silhouette appears unremarkable      The lungs are clear  No pneumothorax or pleural effusion      Osseous structures appear within normal limits for patient age      IMPRESSION:     No focal consolidation                                        MDM      Disposition  Final diagnoses:   Chemical exposure     Time reflects when diagnosis was documented in both MDM as applicable and the Disposition within this note     Time User Action Codes Description Comment    3/29/2020  8:21 PM Pleas Haveguillaume Add [C60 628] Chemical exposure       ED Disposition     ED Disposition Condition Date/Time Comment    Discharge Stable Sun Mar 29, 2020  8:21 PM Alejandro Johnston discharge to home/self care  Follow-up Information     Follow up With Specialties Details [facility]  Emergency Department Emergency Medicine Go to  if you note worsening breathing problems  [address] [id number]  [phone] [place] [facility], [address], [address]          Patient's Medications   Discharge Prescriptions    No medications on file     No discharge procedures on file      PDMP Review     None          ED Provider  Electronically Signed by           Yaz Green PA-C  03/29/20 2022

## 2020-09-23 NOTE — ED NOTES
Attempt made to page SOD once more   SOD never responded to first page      Radha Avila RN  12/05/19 1929 Letter mailed to patient with results